# Patient Record
Sex: FEMALE | Race: WHITE | Employment: FULL TIME | URBAN - METROPOLITAN AREA
[De-identification: names, ages, dates, MRNs, and addresses within clinical notes are randomized per-mention and may not be internally consistent; named-entity substitution may affect disease eponyms.]

---

## 2017-05-22 ENCOUNTER — OFFICE VISIT (OUTPATIENT)
Dept: FAMILY MEDICINE CLINIC | Age: 62
End: 2017-05-22

## 2017-05-22 VITALS
DIASTOLIC BLOOD PRESSURE: 78 MMHG | BODY MASS INDEX: 23.79 KG/M2 | RESPIRATION RATE: 18 BRPM | HEIGHT: 68 IN | SYSTOLIC BLOOD PRESSURE: 136 MMHG | TEMPERATURE: 98.7 F | WEIGHT: 157 LBS | HEART RATE: 71 BPM | OXYGEN SATURATION: 98 %

## 2017-05-22 DIAGNOSIS — E55.9 VITAMIN D DEFICIENCY: ICD-10-CM

## 2017-05-22 DIAGNOSIS — Z13.220 SCREENING FOR LIPID DISORDERS: ICD-10-CM

## 2017-05-22 DIAGNOSIS — R53.83 FATIGUE, UNSPECIFIED TYPE: ICD-10-CM

## 2017-05-22 DIAGNOSIS — F41.9 ANXIETY: Primary | ICD-10-CM

## 2017-05-22 LAB
ALBUMIN SERPL-MCNC: 4.3 G/DL (ref 3.9–4.9)
ALP BLD-CCNC: 65 U/L (ref 40–130)
ALT SERPL-CCNC: 29 U/L (ref 0–33)
ANION GAP SERPL CALCULATED.3IONS-SCNC: 14 MEQ/L (ref 7–13)
AST SERPL-CCNC: 25 U/L (ref 0–35)
BASOPHILS ABSOLUTE: 0 K/UL (ref 0–0.2)
BASOPHILS RELATIVE PERCENT: 0.7 %
BILIRUB SERPL-MCNC: 0.2 MG/DL (ref 0–1.2)
BUN BLDV-MCNC: 24 MG/DL (ref 6–20)
CALCIUM SERPL-MCNC: 9 MG/DL (ref 8.6–10.2)
CHLORIDE BLD-SCNC: 103 MEQ/L (ref 98–107)
CHOLESTEROL, TOTAL: 188 MG/DL (ref 0–199)
CO2: 24 MEQ/L (ref 22–29)
CREAT SERPL-MCNC: 0.93 MG/DL (ref 0.5–0.9)
EOSINOPHILS ABSOLUTE: 0.1 K/UL (ref 0–0.7)
EOSINOPHILS RELATIVE PERCENT: 1.4 %
GFR AFRICAN AMERICAN: >60
GFR NON-AFRICAN AMERICAN: >60
GLOBULIN: 2.5 G/DL (ref 2.3–3.5)
GLUCOSE BLD-MCNC: 94 MG/DL (ref 74–109)
HCT VFR BLD CALC: 39.6 % (ref 37–47)
HDLC SERPL-MCNC: 66 MG/DL (ref 40–59)
HEMOGLOBIN: 12.8 G/DL (ref 12–16)
LDL CHOLESTEROL CALCULATED: 87 MG/DL (ref 0–129)
LYMPHOCYTES ABSOLUTE: 1.9 K/UL (ref 1–4.8)
LYMPHOCYTES RELATIVE PERCENT: 31.7 %
MCH RBC QN AUTO: 31.7 PG (ref 27–31.3)
MCHC RBC AUTO-ENTMCNC: 32.5 % (ref 33–37)
MCV RBC AUTO: 97.4 FL (ref 82–100)
MONOCYTES ABSOLUTE: 0.4 K/UL (ref 0.2–0.8)
MONOCYTES RELATIVE PERCENT: 6.7 %
NEUTROPHILS ABSOLUTE: 3.5 K/UL (ref 1.4–6.5)
NEUTROPHILS RELATIVE PERCENT: 59.5 %
PDW BLD-RTO: 13.9 % (ref 11.5–14.5)
PLATELET # BLD: 160 K/UL (ref 130–400)
POTASSIUM SERPL-SCNC: 4.6 MEQ/L (ref 3.5–5.1)
RBC # BLD: 4.06 M/UL (ref 4.2–5.4)
SODIUM BLD-SCNC: 141 MEQ/L (ref 132–144)
T3 FREE: 2.4 PG/ML (ref 2–4.4)
T4 FREE: 0.95 NG/DL (ref 0.93–1.7)
TOTAL PROTEIN: 6.8 G/DL (ref 6.4–8.1)
TRIGL SERPL-MCNC: 176 MG/DL (ref 0–200)
TSH SERPL DL<=0.05 MIU/L-ACNC: 1.65 UIU/ML (ref 0.27–4.2)
VITAMIN D 25-HYDROXY: 41.1 NG/ML (ref 30–100)
WBC # BLD: 5.9 K/UL (ref 4.8–10.8)

## 2017-05-22 PROCEDURE — 99213 OFFICE O/P EST LOW 20 MIN: CPT | Performed by: NURSE PRACTITIONER

## 2017-05-22 RX ORDER — CLONAZEPAM 0.5 MG/1
0.25 TABLET ORAL 2 TIMES DAILY PRN
COMMUNITY
End: 2017-06-30 | Stop reason: ALTCHOICE

## 2017-05-22 ASSESSMENT — PATIENT HEALTH QUESTIONNAIRE - PHQ9
SUM OF ALL RESPONSES TO PHQ QUESTIONS 1-9: 0
1. LITTLE INTEREST OR PLEASURE IN DOING THINGS: 0
SUM OF ALL RESPONSES TO PHQ9 QUESTIONS 1 & 2: 0
2. FEELING DOWN, DEPRESSED OR HOPELESS: 0

## 2017-06-30 ENCOUNTER — OFFICE VISIT (OUTPATIENT)
Dept: FAMILY MEDICINE CLINIC | Age: 62
End: 2017-06-30

## 2017-06-30 VITALS
DIASTOLIC BLOOD PRESSURE: 72 MMHG | HEART RATE: 87 BPM | SYSTOLIC BLOOD PRESSURE: 122 MMHG | RESPIRATION RATE: 14 BRPM | BODY MASS INDEX: 23.49 KG/M2 | WEIGHT: 155 LBS | HEIGHT: 68 IN | TEMPERATURE: 98.3 F

## 2017-06-30 DIAGNOSIS — F41.1 GAD (GENERALIZED ANXIETY DISORDER): ICD-10-CM

## 2017-06-30 DIAGNOSIS — N95.1 SYMPTOMATIC MENOPAUSAL OR FEMALE CLIMACTERIC STATES: Primary | ICD-10-CM

## 2017-06-30 PROCEDURE — 99213 OFFICE O/P EST LOW 20 MIN: CPT | Performed by: FAMILY MEDICINE

## 2017-06-30 RX ORDER — CLONAZEPAM 0.5 MG/1
0.5 TABLET ORAL DAILY PRN
Qty: 30 TABLET | Refills: 0 | Status: ON HOLD | OUTPATIENT
Start: 2017-06-30 | End: 2017-12-22

## 2017-07-10 ASSESSMENT — ENCOUNTER SYMPTOMS
BLOOD IN STOOL: 0
ABDOMINAL PAIN: 0
SHORTNESS OF BREATH: 0

## 2017-09-14 DIAGNOSIS — J20.9 BRONCHOSPASM WITH BRONCHITIS, ACUTE: ICD-10-CM

## 2017-09-14 RX ORDER — ALBUTEROL SULFATE 90 UG/1
2 AEROSOL, METERED RESPIRATORY (INHALATION) EVERY 6 HOURS PRN
Qty: 1 INHALER | Refills: 3 | Status: SHIPPED | OUTPATIENT
Start: 2017-09-14 | End: 2018-10-15 | Stop reason: SDUPTHER

## 2017-10-27 ENCOUNTER — OFFICE VISIT (OUTPATIENT)
Dept: FAMILY MEDICINE CLINIC | Age: 62
End: 2017-10-27

## 2017-10-27 VITALS
TEMPERATURE: 97 F | RESPIRATION RATE: 14 BRPM | HEART RATE: 79 BPM | HEIGHT: 68 IN | SYSTOLIC BLOOD PRESSURE: 108 MMHG | DIASTOLIC BLOOD PRESSURE: 70 MMHG | WEIGHT: 155 LBS | BODY MASS INDEX: 23.49 KG/M2

## 2017-10-27 DIAGNOSIS — F41.1 GAD (GENERALIZED ANXIETY DISORDER): Primary | ICD-10-CM

## 2017-10-27 PROCEDURE — 3014F SCREEN MAMMO DOC REV: CPT | Performed by: FAMILY MEDICINE

## 2017-10-27 PROCEDURE — G8427 DOCREV CUR MEDS BY ELIG CLIN: HCPCS | Performed by: FAMILY MEDICINE

## 2017-10-27 PROCEDURE — G8484 FLU IMMUNIZE NO ADMIN: HCPCS | Performed by: FAMILY MEDICINE

## 2017-10-27 PROCEDURE — 1036F TOBACCO NON-USER: CPT | Performed by: FAMILY MEDICINE

## 2017-10-27 PROCEDURE — 3017F COLORECTAL CA SCREEN DOC REV: CPT | Performed by: FAMILY MEDICINE

## 2017-10-27 PROCEDURE — 99212 OFFICE O/P EST SF 10 MIN: CPT | Performed by: FAMILY MEDICINE

## 2017-10-27 PROCEDURE — G8420 CALC BMI NORM PARAMETERS: HCPCS | Performed by: FAMILY MEDICINE

## 2017-10-27 NOTE — PROGRESS NOTES
Subjective  Constantine Mortimer, 63 y.o. female presents today with:  Chief Complaint   Patient presents with    Anxiety     4 mnth f/u        HPI  Patient presents today for a four month follow up for anxiety. 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 .and son's wedding, much less stress  Uses klonopin PRN only and effective  Rev/rxs; No abuse nor side effects on meds   ; No cardio-pulmonary probs;compliant with diet/rxs;no new gi-gu complaints   Review of Systems   Constitutional: Negative for fatigue and unexpected weight change. Cardiovascular: Negative for chest pain, palpitations and leg swelling. Gastrointestinal: Negative for abdominal pain and blood in stool. Musculoskeletal: Negative for arthralgias. Skin: Positive for rash (s/p wide excision of right upper arm melanoma excision/revision). Neurological: Negative for headaches. Psychiatric/Behavioral: Negative for confusion, sleep disturbance and suicidal ideas. The patient is nervous/anxious (stable/rxs prn). Allergies   Allergen Reactions    Cephalexin      Other reaction(s): Intolerance  Patient started turning bright red after first day of taking medication.  Doxycycline Other (See Comments)     Cramping throughout body    Pristiq [Desvenlafaxine] Nausea Only       Past Medical History:   Diagnosis Date    Adult ADHD     Bulging disc     DJD (degenerative joint disease)     GERD (gastroesophageal reflux disease)     Herniated disc     L5-S1    History of depression      Past Surgical History:   Procedure Laterality Date    COLONOSCOPY  01/10/2014    DR. EVANS    [facility]     TUBAL LIGATION  2/1997     Social History     Social History    Marital status:      Spouse name: N/A    Number of children: N/A    Years of education: N/A     Occupational History    Not on file.      Social History Main Topics    Smoking status: Never Smoker    Smokeless tobacco: Never Used    Alcohol use No    Drug use: No    Sexual activity: Not on file     Other Topics Concern    Not on file     Social History Narrative    No narrative on file     Family History   Problem Relation Age of Onset    Mental Illness Mother     Cancer Father      SKIN CANCER          Current Outpatient Prescriptions on File Prior to Visit   Medication Sig Dispense Refill    albuterol sulfate HFA (PROAIR HFA) 108 (90 Base) MCG/ACT inhaler Inhale 2 puffs into the lungs every 6 hours as needed for Wheezing 1 Inhaler 3    clonazePAM (KLONOPIN) 0.5 MG tablet Take 1 tablet by mouth daily as needed for Anxiety 30 tablet 0    cyclobenzaprine (FLEXERIL) 10 MG tablet Take 1/2 at lunch and 1 qhs prn tightness 30 tablet 2     No current facility-administered medications on file prior to visit. Objective    Vitals:    10/27/17 0930   BP: 108/70   Pulse: 79   Resp: 14   Temp: 97 °F (36.1 °C)   TempSrc: Temporal   Weight: 155 lb (70.3 kg)   Height: 5' 8\" (1.727 m)     Physical Exam   Constitutional: She is well-developed, well-nourished, and in no distress. No distress. Neck: Normal range of motion. Neck supple. No thyromegaly present. Cardiovascular: Normal rate, regular rhythm and normal heart sounds. Exam reveals no gallop. No murmur heard. Pulmonary/Chest: Effort normal and breath sounds normal.   Musculoskeletal: She exhibits no edema. Skin: She is not diaphoretic. Nicely healing incision;color good   Psychiatric: Mood, memory and affect normal.   ;  Assessment & Plan   1. LORI (generalized anxiety disorder)     f/u PS prn  Klonopin prn  ;See above orders, as use and side effects reviewed ;Continue same meds, as common side effects and use reviewed-call if ineffective or problems ;   Outpatient Encounter Prescriptions as of 10/27/2017   Medication Sig Dispense Refill    albuterol sulfate HFA (PROAIR HFA) 108 (90 Base) MCG/ACT inhaler Inhale 2 puffs into the lungs every 6 hours as needed for Wheezing 1 Inhaler 3    clonazePAM (KLONOPIN) 0.5 MG tablet Take 1 tablet by mouth daily as needed for Anxiety 30 tablet 0    [DISCONTINUED] ESTRADIOL PO Take by mouth      cyclobenzaprine (FLEXERIL) 10 MG tablet Take 1/2 at lunch and 1 qhs prn tightness 30 tablet 2     No facility-administered encounter medications on file as of 10/27/2017. Call if probs  No orders of the defined types were placed in this encounter. No orders of the defined types were placed in this encounter. Medications Discontinued During This Encounter   Medication Reason    ESTRADIOL PO Therapy completed     Return in about 3 months (around 1/27/2018) for delia. Call or return to clinic prn if these symptoms worsen or fail to improve as anticipated.       Patrick Jain, DO

## 2017-11-07 PROBLEM — F41.1 GAD (GENERALIZED ANXIETY DISORDER): Status: ACTIVE | Noted: 2017-11-07

## 2017-11-07 ASSESSMENT — ENCOUNTER SYMPTOMS
ABDOMINAL PAIN: 0
BLOOD IN STOOL: 0

## 2017-12-20 RX ORDER — LIDOCAINE HYDROCHLORIDE 10 MG/ML
1 INJECTION, SOLUTION EPIDURAL; INFILTRATION; INTRACAUDAL; PERINEURAL
Status: CANCELLED | OUTPATIENT
Start: 2017-12-20 | End: 2017-12-20

## 2017-12-22 ENCOUNTER — ANESTHESIA (OUTPATIENT)
Dept: ENDOSCOPY | Age: 62
End: 2017-12-22
Payer: COMMERCIAL

## 2017-12-22 ENCOUNTER — ANESTHESIA EVENT (OUTPATIENT)
Dept: ENDOSCOPY | Age: 62
End: 2017-12-22
Payer: COMMERCIAL

## 2017-12-22 ENCOUNTER — HOSPITAL ENCOUNTER (OUTPATIENT)
Age: 62
Setting detail: OUTPATIENT SURGERY
Discharge: HOME | End: 2017-12-22
Attending: SPECIALIST | Admitting: SPECIALIST
Payer: COMMERCIAL

## 2017-12-22 VITALS
HEART RATE: 64 BPM | HEIGHT: 68 IN | RESPIRATION RATE: 16 BRPM | OXYGEN SATURATION: 100 % | BODY MASS INDEX: 23.49 KG/M2 | TEMPERATURE: 97.9 F | WEIGHT: 155 LBS | DIASTOLIC BLOOD PRESSURE: 82 MMHG | SYSTOLIC BLOOD PRESSURE: 139 MMHG

## 2017-12-22 VITALS
SYSTOLIC BLOOD PRESSURE: 116 MMHG | DIASTOLIC BLOOD PRESSURE: 67 MMHG | OXYGEN SATURATION: 99 % | RESPIRATION RATE: 14 BRPM

## 2017-12-22 PROCEDURE — 3609027000 HC COLONOSCOPY: Performed by: SPECIALIST

## 2017-12-22 PROCEDURE — 2500000003 HC RX 250 WO HCPCS

## 2017-12-22 PROCEDURE — 7100000010 HC PHASE II RECOVERY - FIRST 15 MIN: Performed by: SPECIALIST

## 2017-12-22 PROCEDURE — 2580000003 HC RX 258: Performed by: SPECIALIST

## 2017-12-22 PROCEDURE — 3700000001 HC ADD 15 MINUTES (ANESTHESIA): Performed by: SPECIALIST

## 2017-12-22 PROCEDURE — 6360000002 HC RX W HCPCS

## 2017-12-22 PROCEDURE — 3700000000 HC ANESTHESIA ATTENDED CARE: Performed by: SPECIALIST

## 2017-12-22 RX ORDER — SODIUM CHLORIDE 0.9 % (FLUSH) 0.9 %
10 SYRINGE (ML) INJECTION PRN
Status: DISCONTINUED | OUTPATIENT
Start: 2017-12-22 | End: 2017-12-22 | Stop reason: HOSPADM

## 2017-12-22 RX ORDER — LIDOCAINE HYDROCHLORIDE 10 MG/ML
INJECTION, SOLUTION INFILTRATION; PERINEURAL PRN
Status: DISCONTINUED | OUTPATIENT
Start: 2017-12-22 | End: 2017-12-22 | Stop reason: SDUPTHER

## 2017-12-22 RX ORDER — PROPOFOL 10 MG/ML
INJECTION, EMULSION INTRAVENOUS PRN
Status: DISCONTINUED | OUTPATIENT
Start: 2017-12-22 | End: 2017-12-22 | Stop reason: SDUPTHER

## 2017-12-22 RX ORDER — SODIUM CHLORIDE 9 MG/ML
INJECTION, SOLUTION INTRAVENOUS CONTINUOUS
Status: DISCONTINUED | OUTPATIENT
Start: 2017-12-22 | End: 2017-12-22 | Stop reason: HOSPADM

## 2017-12-22 RX ORDER — SODIUM CHLORIDE 0.9 % (FLUSH) 0.9 %
10 SYRINGE (ML) INJECTION EVERY 12 HOURS SCHEDULED
Status: DISCONTINUED | OUTPATIENT
Start: 2017-12-22 | End: 2017-12-22 | Stop reason: HOSPADM

## 2017-12-22 RX ADMIN — PROPOFOL 20 MG: 10 INJECTION, EMULSION INTRAVENOUS at 12:00

## 2017-12-22 RX ADMIN — PROPOFOL 20 MG: 10 INJECTION, EMULSION INTRAVENOUS at 11:58

## 2017-12-22 RX ADMIN — PROPOFOL 40 MG: 10 INJECTION, EMULSION INTRAVENOUS at 11:56

## 2017-12-22 RX ADMIN — LIDOCAINE HYDROCHLORIDE 30 MG: 10 INJECTION, SOLUTION INFILTRATION; PERINEURAL at 11:56

## 2017-12-22 RX ADMIN — Medication 5 ML: at 11:56

## 2017-12-22 NOTE — ANESTHESIA PRE PROCEDURE
HISTORY  1984    BIRTH [place]     TUBAL LIGATION  2/1997       Social History:    Social History   Substance Use Topics    Smoking status: Never Smoker    Smokeless tobacco: Never Used    Alcohol use 0.6 oz/week     1 Glasses of wine per week      Comment: occasionally                                Counseling given: Not Answered      Vital Signs (Current):   Vitals:    12/22/17 1049   BP: 137/77   Pulse: 78   Resp: 16   Temp: 36.6 °C (97.9 °F)   TempSrc: Temporal   SpO2: 100%   Weight: 155 lb (70.3 kg)   Height: 5' 8\" (1.727 m)                                              BP Readings from Last 3 Encounters:   12/22/17 137/77   10/27/17 108/70   06/30/17 122/72       NPO Status: Time of last liquid consumption: 0700 (Approx. 4 ounces of water.)                        Time of last solid consumption: 2000                        Date of last liquid consumption: 12/22/17                        Date of last solid food consumption: 12/20/17    BMI:   Wt Readings from Last 3 Encounters:   12/22/17 155 lb (70.3 kg)   10/27/17 155 lb (70.3 kg)   06/30/17 155 lb (70.3 kg)     Body mass index is 23.57 kg/m². CBC:   Lab Results   Component Value Date    WBC 5.9 05/22/2017    RBC 4.06 05/22/2017    HGB 12.8 05/22/2017    HCT 39.6 05/22/2017    MCV 97.4 05/22/2017    RDW 13.9 05/22/2017     05/22/2017       CMP:   Lab Results   Component Value Date     05/22/2017    K 4.6 05/22/2017     05/22/2017    CO2 24 05/22/2017    BUN 24 05/22/2017    CREATININE 0.93 05/22/2017    GFRAA >60.0 05/22/2017    LABGLOM >60.0 05/22/2017    GLUCOSE 94 05/22/2017    PROT 6.8 05/22/2017    CALCIUM 9.0 05/22/2017    BILITOT 0.2 05/22/2017    ALKPHOS 65 05/22/2017    AST 25 05/22/2017    ALT 29 05/22/2017       POC Tests: No results for input(s): POCGLU, POCNA, POCK, POCCL, POCBUN, POCHEMO, POCHCT in the last 72 hours.     Coags: No results found for: PROTIME, INR, APTT    HCG (If Applicable): No results found for: PREGTESTUR,

## 2018-01-24 ENCOUNTER — TELEPHONE (OUTPATIENT)
Dept: OTHER | Facility: CLINIC | Age: 63
End: 2018-01-24

## 2018-10-05 ENCOUNTER — OFFICE VISIT (OUTPATIENT)
Dept: FAMILY MEDICINE CLINIC | Age: 63
End: 2018-10-05
Payer: COMMERCIAL

## 2018-10-05 ENCOUNTER — TELEPHONE (OUTPATIENT)
Dept: FAMILY MEDICINE CLINIC | Age: 63
End: 2018-10-05

## 2018-10-05 VITALS
BODY MASS INDEX: 23.95 KG/M2 | SYSTOLIC BLOOD PRESSURE: 120 MMHG | TEMPERATURE: 97.7 F | HEART RATE: 77 BPM | RESPIRATION RATE: 12 BRPM | HEIGHT: 68 IN | OXYGEN SATURATION: 98 % | WEIGHT: 158 LBS | DIASTOLIC BLOOD PRESSURE: 70 MMHG

## 2018-10-05 DIAGNOSIS — Z12.39 SCREENING FOR BREAST CANCER: Primary | ICD-10-CM

## 2018-10-05 DIAGNOSIS — H73.891 TYMPANIC MEMBRANE RETRACTION, RIGHT: Primary | ICD-10-CM

## 2018-10-05 DIAGNOSIS — J34.89 SINUS PRESSURE: ICD-10-CM

## 2018-10-05 PROBLEM — C43.61 MALIGNANT MELANOMA OF RIGHT UPPER EXTREMITY INCLUDING SHOULDER (HCC): Status: ACTIVE | Noted: 2017-09-14

## 2018-10-05 PROCEDURE — 99213 OFFICE O/P EST LOW 20 MIN: CPT | Performed by: NURSE PRACTITIONER

## 2018-10-05 RX ORDER — AMOXICILLIN 500 MG/1
500 CAPSULE ORAL 3 TIMES DAILY
Qty: 21 CAPSULE | Refills: 0 | Status: SHIPPED | OUTPATIENT
Start: 2018-10-05 | End: 2018-10-12

## 2018-10-05 RX ORDER — METHYLPREDNISOLONE 4 MG/1
TABLET ORAL
Qty: 1 KIT | Refills: 0 | Status: SHIPPED | OUTPATIENT
Start: 2018-10-05

## 2018-10-05 RX ORDER — LEVOCETIRIZINE DIHYDROCHLORIDE 5 MG/1
5 TABLET, FILM COATED ORAL NIGHTLY
Qty: 30 TABLET | Refills: 0 | Status: SHIPPED | OUTPATIENT
Start: 2018-10-05

## 2018-10-05 ASSESSMENT — PATIENT HEALTH QUESTIONNAIRE - PHQ9
SUM OF ALL RESPONSES TO PHQ9 QUESTIONS 1 & 2: 0
2. FEELING DOWN, DEPRESSED OR HOPELESS: 0
SUM OF ALL RESPONSES TO PHQ QUESTIONS 1-9: 0
SUM OF ALL RESPONSES TO PHQ QUESTIONS 1-9: 0
1. LITTLE INTEREST OR PLEASURE IN DOING THINGS: 0

## 2018-10-08 ENCOUNTER — TELEPHONE (OUTPATIENT)
Dept: FAMILY MEDICINE CLINIC | Age: 63
End: 2018-10-08

## 2018-10-15 DIAGNOSIS — J20.9 BRONCHOSPASM WITH BRONCHITIS, ACUTE: ICD-10-CM

## 2018-10-15 ASSESSMENT — ENCOUNTER SYMPTOMS
VOMITING: 0
RHINORRHEA: 1
SORE THROAT: 0
DIARRHEA: 0
COUGH: 0
ABDOMINAL PAIN: 0

## 2018-10-16 ENCOUNTER — TELEPHONE (OUTPATIENT)
Dept: FAMILY MEDICINE CLINIC | Age: 63
End: 2018-10-16

## 2018-10-16 DIAGNOSIS — J01.90 ACUTE BACTERIAL SINUSITIS: Primary | ICD-10-CM

## 2018-10-16 DIAGNOSIS — B96.89 ACUTE BACTERIAL SINUSITIS: Primary | ICD-10-CM

## 2018-10-16 RX ORDER — AZITHROMYCIN 250 MG/1
TABLET, FILM COATED ORAL
Qty: 6 TABLET | Refills: 0 | Status: SHIPPED | OUTPATIENT
Start: 2018-10-16 | End: 2018-10-26

## 2018-10-16 NOTE — PROGRESS NOTES
Subjective  Carmela Marie, 63 y.o. female presents today with:  Chief Complaint   Patient presents with    Otitis Media     right ear pain       Otalgia    There is pain in the right ear. This is a new problem. The current episode started in the past 7 days. The problem occurs constantly. The problem has been gradually worsening. There has been no fever. The pain is at a severity of 5/10. The pain is moderate. Associated symptoms include headaches (and sinus pressure) and rhinorrhea. Pertinent negatives include no abdominal pain, coughing, diarrhea, ear discharge, hearing loss, neck pain, rash, sore throat or vomiting. She has tried NSAIDs for the symptoms. The treatment provided no relief. Objective    Vitals:    10/05/18 1006   BP: 120/70   Pulse: 77   Resp: 12   Temp: 97.7 °F (36.5 °C)   TempSrc: Temporal   SpO2: 98%   Weight: 158 lb (71.7 kg)   Height: 5' 8\" (1.727 m)       Physical Exam   Constitutional: She is oriented to person, place, and time. She appears well-developed and well-nourished. HENT:   Head: Normocephalic and atraumatic. Right Ear: Hearing, external ear and ear canal normal. Tympanic membrane is retracted. Left Ear: Hearing, external ear and ear canal normal. A middle ear effusion is present. Nose: Mucosal edema present. No rhinorrhea. Right sinus exhibits no maxillary sinus tenderness and no frontal sinus tenderness. Left sinus exhibits no maxillary sinus tenderness and no frontal sinus tenderness. Mouth/Throat: Uvula is midline, oropharynx is clear and moist and mucous membranes are normal.   Eyes: EOM are normal.   Neck: Normal range of motion. Cardiovascular: Normal rate, regular rhythm and normal heart sounds. Pulmonary/Chest: Effort normal and breath sounds normal.   Abdominal: Soft. Bowel sounds are normal.   Musculoskeletal: Normal range of motion. Neurological: She is alert and oriented to person, place, and time. Skin: Skin is warm and dry.    Psychiatric: She has a normal mood and affect. Assessment & Plan    Diagnosis Orders   1. Tympanic membrane retraction, right  amoxicillin (AMOXIL) 500 MG capsule    levocetirizine (XYZAL) 5 MG tablet    methylPREDNISolone (MEDROL DOSEPACK) 4 MG tablet   2. Sinus pressure  amoxicillin (AMOXIL) 500 MG capsule    levocetirizine (XYZAL) 5 MG tablet    methylPREDNISolone (MEDROL DOSEPACK) 4 MG tablet       Return if symptoms worsen or fail to improve. Reviewed with the patient: current clinical status, medications, activities and diet. Side effects, adverse effects of the medication prescribed today, as well as treatment plan/ rationale and result expectations have beendiscussed with the patient who expresses understanding and desires to proceed. Close follow up to evaluate treatment results and for coordinationof care. I have reviewed the patient's medical history in detail and updated the computerized patient record.     Niels Cunningham, APRN - CNP

## 2019-03-01 ENCOUNTER — TELEPHONE (OUTPATIENT)
Dept: FAMILY MEDICINE CLINIC | Age: 64
End: 2019-03-01

## 2024-04-19 ENCOUNTER — APPOINTMENT (OUTPATIENT)
Dept: RADIOLOGY | Facility: HOSPITAL | Age: 69
DRG: 482 | End: 2024-04-19
Payer: MEDICARE

## 2024-04-19 ENCOUNTER — HOSPITAL ENCOUNTER (INPATIENT)
Facility: HOSPITAL | Age: 69
LOS: 2 days | Discharge: HOME | DRG: 482 | End: 2024-04-21
Attending: STUDENT IN AN ORGANIZED HEALTH CARE EDUCATION/TRAINING PROGRAM | Admitting: STUDENT IN AN ORGANIZED HEALTH CARE EDUCATION/TRAINING PROGRAM
Payer: MEDICARE

## 2024-04-19 ENCOUNTER — APPOINTMENT (OUTPATIENT)
Dept: CARDIOLOGY | Facility: HOSPITAL | Age: 69
DRG: 482 | End: 2024-04-19
Payer: MEDICARE

## 2024-04-19 DIAGNOSIS — M80.059D HIP FRACTURE DUE TO OSTEOPOROSIS WITH ROUTINE HEALING: ICD-10-CM

## 2024-04-19 DIAGNOSIS — S72.012A CLOSED SUBCAPITAL FRACTURE OF LEFT FEMUR, INITIAL ENCOUNTER (MULTI): Primary | ICD-10-CM

## 2024-04-19 LAB
ABO GROUP (TYPE) IN BLOOD: NORMAL
ANION GAP SERPL CALC-SCNC: 12 MMOL/L (ref 10–20)
ANTIBODY SCREEN: NORMAL
APTT PPP: 34 SECONDS (ref 27–38)
BASOPHILS # BLD AUTO: 0.02 X10*3/UL (ref 0–0.1)
BASOPHILS NFR BLD AUTO: 0.3 %
BUN SERPL-MCNC: 18 MG/DL (ref 6–23)
CALCIUM SERPL-MCNC: 9.4 MG/DL (ref 8.6–10.3)
CARDIAC TROPONIN I PNL SERPL HS: 11 NG/L (ref 0–13)
CARDIAC TROPONIN I PNL SERPL HS: 12 NG/L (ref 0–13)
CHLORIDE SERPL-SCNC: 106 MMOL/L (ref 98–107)
CO2 SERPL-SCNC: 25 MMOL/L (ref 21–32)
CREAT SERPL-MCNC: 0.93 MG/DL (ref 0.5–1.05)
EGFRCR SERPLBLD CKD-EPI 2021: 68 ML/MIN/1.73M*2
EOSINOPHIL # BLD AUTO: 0.04 X10*3/UL (ref 0–0.7)
EOSINOPHIL NFR BLD AUTO: 0.7 %
ERYTHROCYTE [DISTWIDTH] IN BLOOD BY AUTOMATED COUNT: 13.4 % (ref 11.5–14.5)
GLUCOSE SERPL-MCNC: 85 MG/DL (ref 74–99)
HCT VFR BLD AUTO: 39.1 % (ref 36–46)
HGB BLD-MCNC: 13.2 G/DL (ref 12–16)
HOLD SPECIMEN: NORMAL
IMM GRANULOCYTES # BLD AUTO: 0.03 X10*3/UL (ref 0–0.7)
IMM GRANULOCYTES NFR BLD AUTO: 0.5 % (ref 0–0.9)
INR PPP: 1 (ref 0.9–1.1)
LYMPHOCYTES # BLD AUTO: 1.58 X10*3/UL (ref 1.2–4.8)
LYMPHOCYTES NFR BLD AUTO: 26.8 %
MCH RBC QN AUTO: 31.6 PG (ref 26–34)
MCHC RBC AUTO-ENTMCNC: 33.8 G/DL (ref 32–36)
MCV RBC AUTO: 94 FL (ref 80–100)
MONOCYTES # BLD AUTO: 0.37 X10*3/UL (ref 0.1–1)
MONOCYTES NFR BLD AUTO: 6.3 %
NEUTROPHILS # BLD AUTO: 3.86 X10*3/UL (ref 1.2–7.7)
NEUTROPHILS NFR BLD AUTO: 65.4 %
NRBC BLD-RTO: 0 /100 WBCS (ref 0–0)
PLATELET # BLD AUTO: 145 X10*3/UL (ref 150–450)
POTASSIUM SERPL-SCNC: 3.7 MMOL/L (ref 3.5–5.3)
PROTHROMBIN TIME: 11.7 SECONDS (ref 9.8–12.8)
RBC # BLD AUTO: 4.18 X10*6/UL (ref 4–5.2)
RH FACTOR (ANTIGEN D): NORMAL
SODIUM SERPL-SCNC: 139 MMOL/L (ref 136–145)
WBC # BLD AUTO: 5.9 X10*3/UL (ref 4.4–11.3)

## 2024-04-19 PROCEDURE — 84484 ASSAY OF TROPONIN QUANT: CPT | Performed by: STUDENT IN AN ORGANIZED HEALTH CARE EDUCATION/TRAINING PROGRAM

## 2024-04-19 PROCEDURE — 72131 CT LUMBAR SPINE W/O DYE: CPT | Performed by: RADIOLOGY

## 2024-04-19 PROCEDURE — 36415 COLL VENOUS BLD VENIPUNCTURE: CPT | Performed by: STUDENT IN AN ORGANIZED HEALTH CARE EDUCATION/TRAINING PROGRAM

## 2024-04-19 PROCEDURE — 85730 THROMBOPLASTIN TIME PARTIAL: CPT | Performed by: STUDENT IN AN ORGANIZED HEALTH CARE EDUCATION/TRAINING PROGRAM

## 2024-04-19 PROCEDURE — 76377 3D RENDER W/INTRP POSTPROCES: CPT

## 2024-04-19 PROCEDURE — 72170 X-RAY EXAM OF PELVIS: CPT | Performed by: RADIOLOGY

## 2024-04-19 PROCEDURE — 2500000004 HC RX 250 GENERAL PHARMACY W/ HCPCS (ALT 636 FOR OP/ED): Performed by: STUDENT IN AN ORGANIZED HEALTH CARE EDUCATION/TRAINING PROGRAM

## 2024-04-19 PROCEDURE — 72170 X-RAY EXAM OF PELVIS: CPT

## 2024-04-19 PROCEDURE — 2500000001 HC RX 250 WO HCPCS SELF ADMINISTERED DRUGS (ALT 637 FOR MEDICARE OP): Performed by: STUDENT IN AN ORGANIZED HEALTH CARE EDUCATION/TRAINING PROGRAM

## 2024-04-19 PROCEDURE — 73552 X-RAY EXAM OF FEMUR 2/>: CPT | Mod: LEFT SIDE | Performed by: RADIOLOGY

## 2024-04-19 PROCEDURE — 85610 PROTHROMBIN TIME: CPT | Performed by: STUDENT IN AN ORGANIZED HEALTH CARE EDUCATION/TRAINING PROGRAM

## 2024-04-19 PROCEDURE — 1100000001 HC PRIVATE ROOM DAILY

## 2024-04-19 PROCEDURE — 93005 ELECTROCARDIOGRAM TRACING: CPT

## 2024-04-19 PROCEDURE — 72192 CT PELVIS W/O DYE: CPT

## 2024-04-19 PROCEDURE — 99285 EMERGENCY DEPT VISIT HI MDM: CPT | Mod: 25

## 2024-04-19 PROCEDURE — 82374 ASSAY BLOOD CARBON DIOXIDE: CPT | Performed by: STUDENT IN AN ORGANIZED HEALTH CARE EDUCATION/TRAINING PROGRAM

## 2024-04-19 PROCEDURE — 85025 COMPLETE CBC W/AUTO DIFF WBC: CPT | Performed by: STUDENT IN AN ORGANIZED HEALTH CARE EDUCATION/TRAINING PROGRAM

## 2024-04-19 PROCEDURE — 73552 X-RAY EXAM OF FEMUR 2/>: CPT | Mod: LT

## 2024-04-19 PROCEDURE — 96375 TX/PRO/DX INJ NEW DRUG ADDON: CPT

## 2024-04-19 PROCEDURE — 72131 CT LUMBAR SPINE W/O DYE: CPT

## 2024-04-19 PROCEDURE — 86901 BLOOD TYPING SEROLOGIC RH(D): CPT | Performed by: STUDENT IN AN ORGANIZED HEALTH CARE EDUCATION/TRAINING PROGRAM

## 2024-04-19 PROCEDURE — 99222 1ST HOSP IP/OBS MODERATE 55: CPT | Performed by: STUDENT IN AN ORGANIZED HEALTH CARE EDUCATION/TRAINING PROGRAM

## 2024-04-19 PROCEDURE — 96374 THER/PROPH/DIAG INJ IV PUSH: CPT

## 2024-04-19 RX ORDER — KETOROLAC TROMETHAMINE 30 MG/ML
30 INJECTION, SOLUTION INTRAMUSCULAR; INTRAVENOUS ONCE
Status: COMPLETED | OUTPATIENT
Start: 2024-04-19 | End: 2024-04-19

## 2024-04-19 RX ORDER — ACETAMINOPHEN 325 MG/1
650 TABLET ORAL EVERY 4 HOURS PRN
Status: DISCONTINUED | OUTPATIENT
Start: 2024-04-19 | End: 2024-04-21 | Stop reason: HOSPADM

## 2024-04-19 RX ORDER — ACETAMINOPHEN 325 MG/1
650 TABLET ORAL EVERY 4 HOURS PRN
Status: DISCONTINUED | OUTPATIENT
Start: 2024-04-19 | End: 2024-04-20 | Stop reason: SDUPTHER

## 2024-04-19 RX ORDER — DIAZEPAM 5 MG/ML
2 INJECTION, SOLUTION INTRAMUSCULAR; INTRAVENOUS ONCE
Status: COMPLETED | OUTPATIENT
Start: 2024-04-19 | End: 2024-04-19

## 2024-04-19 RX ORDER — MINERAL OIL
1 ENEMA (ML) RECTAL DAILY PRN
COMMUNITY

## 2024-04-19 RX ORDER — CYCLOSPORINE 0.5 MG/ML
1 EMULSION OPHTHALMIC 2 TIMES DAILY
COMMUNITY

## 2024-04-19 RX ORDER — HYDRALAZINE HYDROCHLORIDE 20 MG/ML
5 INJECTION INTRAMUSCULAR; INTRAVENOUS EVERY 6 HOURS PRN
Status: DISCONTINUED | OUTPATIENT
Start: 2024-04-19 | End: 2024-04-21 | Stop reason: HOSPADM

## 2024-04-19 RX ORDER — POLYETHYLENE GLYCOL 3350 17 G/17G
17 POWDER, FOR SOLUTION ORAL DAILY PRN
Status: DISCONTINUED | OUTPATIENT
Start: 2024-04-19 | End: 2024-04-21 | Stop reason: HOSPADM

## 2024-04-19 RX ORDER — ACETAMINOPHEN 160 MG/5ML
650 SOLUTION ORAL EVERY 4 HOURS PRN
Status: DISCONTINUED | OUTPATIENT
Start: 2024-04-19 | End: 2024-04-20 | Stop reason: SDUPTHER

## 2024-04-19 RX ORDER — ONDANSETRON HYDROCHLORIDE 2 MG/ML
4 INJECTION, SOLUTION INTRAVENOUS EVERY 8 HOURS PRN
Status: DISCONTINUED | OUTPATIENT
Start: 2024-04-19 | End: 2024-04-20

## 2024-04-19 RX ORDER — ACETAMINOPHEN 650 MG/1
650 SUPPOSITORY RECTAL EVERY 4 HOURS PRN
Status: DISCONTINUED | OUTPATIENT
Start: 2024-04-19 | End: 2024-04-20 | Stop reason: SDUPTHER

## 2024-04-19 RX ORDER — ONDANSETRON 4 MG/1
4 TABLET, FILM COATED ORAL EVERY 8 HOURS PRN
Status: DISCONTINUED | OUTPATIENT
Start: 2024-04-19 | End: 2024-04-20

## 2024-04-19 RX ORDER — ACETAMINOPHEN 650 MG/1
650 SUPPOSITORY RECTAL EVERY 4 HOURS PRN
Status: DISCONTINUED | OUTPATIENT
Start: 2024-04-19 | End: 2024-04-21 | Stop reason: HOSPADM

## 2024-04-19 RX ORDER — ALBUTEROL SULFATE 90 UG/1
2 AEROSOL, METERED RESPIRATORY (INHALATION) EVERY 4 HOURS PRN
COMMUNITY

## 2024-04-19 RX ORDER — DOCUSATE SODIUM 100 MG/1
100 CAPSULE, LIQUID FILLED ORAL 2 TIMES DAILY
Status: DISCONTINUED | OUTPATIENT
Start: 2024-04-19 | End: 2024-04-21 | Stop reason: HOSPADM

## 2024-04-19 RX ORDER — MORPHINE SULFATE 2 MG/ML
2 INJECTION, SOLUTION INTRAMUSCULAR; INTRAVENOUS EVERY 4 HOURS PRN
Status: DISCONTINUED | OUTPATIENT
Start: 2024-04-19 | End: 2024-04-20 | Stop reason: SDUPTHER

## 2024-04-19 RX ORDER — ENOXAPARIN SODIUM 100 MG/ML
40 INJECTION SUBCUTANEOUS EVERY 24 HOURS
Status: DISCONTINUED | OUTPATIENT
Start: 2024-04-19 | End: 2024-04-21 | Stop reason: HOSPADM

## 2024-04-19 RX ORDER — ACETAMINOPHEN 160 MG/5ML
650 SOLUTION ORAL EVERY 4 HOURS PRN
Status: DISCONTINUED | OUTPATIENT
Start: 2024-04-19 | End: 2024-04-21 | Stop reason: HOSPADM

## 2024-04-19 RX ORDER — OXYCODONE AND ACETAMINOPHEN 5; 325 MG/1; MG/1
1 TABLET ORAL EVERY 6 HOURS PRN
Status: DISCONTINUED | OUTPATIENT
Start: 2024-04-19 | End: 2024-04-21 | Stop reason: HOSPADM

## 2024-04-19 RX ORDER — NEOMYCIN SULFATE, POLYMYXIN B SULFATE, AND DEXAMETHASONE 3.5; 10000; 1 MG/G; [USP'U]/G; MG/G
1 OINTMENT OPHTHALMIC 2 TIMES DAILY
COMMUNITY
Start: 2024-04-19

## 2024-04-19 RX ORDER — TALC
3 POWDER (GRAM) TOPICAL NIGHTLY PRN
Status: DISCONTINUED | OUTPATIENT
Start: 2024-04-19 | End: 2024-04-21 | Stop reason: HOSPADM

## 2024-04-19 RX ADMIN — DIAZEPAM 2 MG: 5 INJECTION, SOLUTION INTRAMUSCULAR; INTRAVENOUS at 14:31

## 2024-04-19 RX ADMIN — OXYCODONE HYDROCHLORIDE AND ACETAMINOPHEN 1 TABLET: 5; 325 TABLET ORAL at 20:00

## 2024-04-19 RX ADMIN — KETOROLAC TROMETHAMINE 30 MG: 30 INJECTION, SOLUTION INTRAMUSCULAR at 14:31

## 2024-04-19 RX ADMIN — DOCUSATE SODIUM 100 MG: 100 CAPSULE, LIQUID FILLED ORAL at 20:01

## 2024-04-19 RX ADMIN — ENOXAPARIN SODIUM 40 MG: 40 INJECTION SUBCUTANEOUS at 20:01

## 2024-04-19 SDOH — SOCIAL STABILITY: SOCIAL INSECURITY: ARE THERE ANY APPARENT SIGNS OF INJURIES/BEHAVIORS THAT COULD BE RELATED TO ABUSE/NEGLECT?: NO

## 2024-04-19 SDOH — SOCIAL STABILITY: SOCIAL INSECURITY: ARE YOU OR HAVE YOU BEEN THREATENED OR ABUSED PHYSICALLY, EMOTIONALLY, OR SEXUALLY BY ANYONE?: NO

## 2024-04-19 SDOH — SOCIAL STABILITY: SOCIAL INSECURITY: DO YOU FEEL ANYONE HAS EXPLOITED OR TAKEN ADVANTAGE OF YOU FINANCIALLY OR OF YOUR PERSONAL PROPERTY?: NO

## 2024-04-19 SDOH — SOCIAL STABILITY: SOCIAL INSECURITY: DO YOU FEEL UNSAFE GOING BACK TO THE PLACE WHERE YOU ARE LIVING?: NO

## 2024-04-19 SDOH — SOCIAL STABILITY: SOCIAL INSECURITY: WERE YOU ABLE TO COMPLETE ALL THE BEHAVIORAL HEALTH SCREENINGS?: YES

## 2024-04-19 SDOH — SOCIAL STABILITY: SOCIAL INSECURITY: HAVE YOU HAD ANY THOUGHTS OF HARMING ANYONE ELSE?: NO

## 2024-04-19 SDOH — SOCIAL STABILITY: SOCIAL INSECURITY: HAS ANYONE EVER THREATENED TO HURT YOUR FAMILY OR YOUR PETS?: NO

## 2024-04-19 SDOH — SOCIAL STABILITY: SOCIAL INSECURITY: HAVE YOU HAD THOUGHTS OF HARMING ANYONE ELSE?: NO

## 2024-04-19 SDOH — SOCIAL STABILITY: SOCIAL INSECURITY: ABUSE: ADULT

## 2024-04-19 SDOH — SOCIAL STABILITY: SOCIAL INSECURITY: DOES ANYONE TRY TO KEEP YOU FROM HAVING/CONTACTING OTHER FRIENDS OR DOING THINGS OUTSIDE YOUR HOME?: NO

## 2024-04-19 ASSESSMENT — ACTIVITIES OF DAILY LIVING (ADL)
GROOMING: INDEPENDENT
WALKS IN HOME: INDEPENDENT
HEARING - LEFT EAR: FUNCTIONAL
JUDGMENT_ADEQUATE_SAFELY_COMPLETE_DAILY_ACTIVITIES: YES
BATHING: INDEPENDENT
TOILETING: NEEDS ASSISTANCE
PATIENT'S MEMORY ADEQUATE TO SAFELY COMPLETE DAILY ACTIVITIES?: YES
HEARING - RIGHT EAR: FUNCTIONAL
FEEDING YOURSELF: INDEPENDENT
DRESSING YOURSELF: INDEPENDENT
LACK_OF_TRANSPORTATION: PATIENT DECLINED
ADEQUATE_TO_COMPLETE_ADL: YES

## 2024-04-19 ASSESSMENT — PAIN DESCRIPTION - LOCATION: LOCATION: HIP

## 2024-04-19 ASSESSMENT — ENCOUNTER SYMPTOMS
GASTROINTESTINAL NEGATIVE: 1
PSYCHIATRIC NEGATIVE: 1
RESPIRATORY NEGATIVE: 1
EYES NEGATIVE: 1
CARDIOVASCULAR NEGATIVE: 1
MUSCULOSKELETAL NEGATIVE: 1
CONSTITUTIONAL NEGATIVE: 1
NEUROLOGICAL NEGATIVE: 1

## 2024-04-19 ASSESSMENT — LIFESTYLE VARIABLES
HOW OFTEN DURING THE LAST YEAR HAVE YOU FAILED TO DO WHAT WAS NORMALLY EXPECTED FROM YOU BECAUSE OF DRINKING: NEVER
HOW MANY STANDARD DRINKS CONTAINING ALCOHOL DO YOU HAVE ON A TYPICAL DAY: 1 OR 2
AUDIT-C TOTAL SCORE: 2
HOW OFTEN DURING THE LAST YEAR HAVE YOU BEEN UNABLE TO REMEMBER WHAT HAPPENED THE NIGHT BEFORE BECAUSE YOU HAD BEEN DRINKING: NEVER
HOW OFTEN DO YOU HAVE 6 OR MORE DRINKS ON ONE OCCASION: NEVER
HOW OFTEN DO YOU HAVE A DRINK CONTAINING ALCOHOL: 2-4 TIMES A MONTH
SKIP TO QUESTIONS 9-10: 1
HOW OFTEN DURING THE LAST YEAR HAVE YOU HAD A FEELING OF GUILT OR REMORSE AFTER DRINKING: NEVER
HOW OFTEN DO YOU HAVE A DRINK CONTAINING ALCOHOL: 2-4 TIMES A MONTH
HOW MANY STANDARD DRINKS CONTAINING ALCOHOL DO YOU HAVE ON A TYPICAL DAY: 1 OR 2
HAVE YOU OR SOMEONE ELSE BEEN INJURED AS A RESULT OF YOUR DRINKING: NO
SUBSTANCE_ABUSE_PAST_12_MONTHS: NO
SKIP TO QUESTIONS 9-10: 1
HOW OFTEN DURING THE LAST YEAR HAVE YOU FOUND THAT YOU WERE NOT ABLE TO STOP DRINKING ONCE YOU HAD STARTED: NEVER
HOW OFTEN DURING THE LAST YEAR HAVE YOU NEEDED AN ALCOHOLIC DRINK FIRST THING IN THE MORNING TO GET YOURSELF GOING AFTER A NIGHT OF HEAVY DRINKING: NEVER
HAS A RELATIVE, FRIEND, DOCTOR, OR ANOTHER HEALTH PROFESSIONAL EXPRESSED CONCERN ABOUT YOUR DRINKING OR SUGGESTED YOU CUT DOWN: NO
AUDIT TOTAL SCORE: 2
PRESCIPTION_ABUSE_PAST_12_MONTHS: NO
HOW OFTEN DO YOU HAVE SIX OR MORE DRINKS ON ONE OCCASION: NEVER

## 2024-04-19 ASSESSMENT — COLUMBIA-SUICIDE SEVERITY RATING SCALE - C-SSRS
6. HAVE YOU EVER DONE ANYTHING, STARTED TO DO ANYTHING, OR PREPARED TO DO ANYTHING TO END YOUR LIFE?: NO
1. IN THE PAST MONTH, HAVE YOU WISHED YOU WERE DEAD OR WISHED YOU COULD GO TO SLEEP AND NOT WAKE UP?: NO
2. HAVE YOU ACTUALLY HAD ANY THOUGHTS OF KILLING YOURSELF?: NO

## 2024-04-19 ASSESSMENT — COGNITIVE AND FUNCTIONAL STATUS - GENERAL
MOBILITY SCORE: 12
MOVING FROM LYING ON BACK TO SITTING ON SIDE OF FLAT BED WITH BEDRAILS: A LITTLE
DRESSING REGULAR LOWER BODY CLOTHING: A LITTLE
MOVING FROM LYING ON BACK TO SITTING ON SIDE OF FLAT BED WITH BEDRAILS: A LITTLE
MOBILITY SCORE: 12
TURNING FROM BACK TO SIDE WHILE IN FLAT BAD: A LITTLE
TOILETING: A LITTLE
STANDING UP FROM CHAIR USING ARMS: A LOT
DAILY ACTIVITIY SCORE: 20
DRESSING REGULAR LOWER BODY CLOTHING: A LITTLE
TOILETING: A LITTLE
TURNING FROM BACK TO SIDE WHILE IN FLAT BAD: A LITTLE
WALKING IN HOSPITAL ROOM: TOTAL
WALKING IN HOSPITAL ROOM: TOTAL
PATIENT BASELINE BEDBOUND: NO
CLIMB 3 TO 5 STEPS WITH RAILING: TOTAL
HELP NEEDED FOR BATHING: A LITTLE
MOVING TO AND FROM BED TO CHAIR: A LOT
CLIMB 3 TO 5 STEPS WITH RAILING: TOTAL
STANDING UP FROM CHAIR USING ARMS: A LOT
MOVING TO AND FROM BED TO CHAIR: A LOT
DRESSING REGULAR UPPER BODY CLOTHING: A LITTLE
DRESSING REGULAR UPPER BODY CLOTHING: A LITTLE
DAILY ACTIVITIY SCORE: 20
HELP NEEDED FOR BATHING: A LITTLE

## 2024-04-19 ASSESSMENT — PAIN SCALES - GENERAL
PAINLEVEL_OUTOF10: 6
PAINLEVEL_OUTOF10: 8
PAINLEVEL_OUTOF10: 2

## 2024-04-19 ASSESSMENT — PAIN - FUNCTIONAL ASSESSMENT
PAIN_FUNCTIONAL_ASSESSMENT: 0-10

## 2024-04-19 ASSESSMENT — PATIENT HEALTH QUESTIONNAIRE - PHQ9
SUM OF ALL RESPONSES TO PHQ9 QUESTIONS 1 & 2: 0
1. LITTLE INTEREST OR PLEASURE IN DOING THINGS: NOT AT ALL
2. FEELING DOWN, DEPRESSED OR HOPELESS: NOT AT ALL

## 2024-04-19 ASSESSMENT — PAIN DESCRIPTION - PAIN TYPE: TYPE: ACUTE PAIN

## 2024-04-19 ASSESSMENT — PAIN DESCRIPTION - ORIENTATION: ORIENTATION: LEFT

## 2024-04-19 NOTE — H&P
History Of Present Illness  Starla Carlos is a 68 y.o. female with past medical history of mitral valve prolapse, colonic polyp, laminectomy, lumbar spinal stenosis, presented to the ED after mechanical fall.  Patient reported that she stepped on a rug and fell.  No dizziness lightheadedness chest pain.  No history of fall in the past.  No significant past medical history, no hypertension even though blood pressure is elevated during presentation.  Patient denies history of seizures    Upon presentation to the ED vital signs include blood pressure Initial was 200 x 78 but improved to 124/56, heart rate 82, saturating 98% on room air, temperature 36.7, labs CBC unremarkable BMP unremarkable initial troponin negative on imaging CT of lumbar spine was done and showed degenerative disc disease L5-S1, L2-L3 left lateral disc herniation with no compression to L2 nerve root, L5-S1 right laminectomy, CT of the pelvis showed impacted subcapital fracture of the left femoral neck patient was admitted for further orthopedic evaluation and managementEKG was done and showed normal sinus rhythm, rate 62, no significant ST-T wave abnormalities,     Past Medical History  As above    Surgical History  Colonoscopy, laminectomy     Social History  No history of cigarette smoking or drug use no alcohol use    Family History  Family history of colon cancer and non-Hodgkin's lymphoma in the mother, family history of melanoma in the father, non-Hodgkin's lymphoma and a brother with melanoma in paternal grandmother     Allergies  Vancomycin    Review of Systems   Constitutional: Negative.    HENT: Negative.     Eyes: Negative.    Respiratory: Negative.     Cardiovascular: Negative.    Gastrointestinal: Negative.    Genitourinary: Negative.    Musculoskeletal: Negative.    Skin: Negative.    Neurological: Negative.    Psychiatric/Behavioral: Negative.          Physical Exam  Constitutional:       General: She is not in acute distress.      "Appearance: Normal appearance. She is not ill-appearing, toxic-appearing or diaphoretic.   HENT:      Head: Normocephalic and atraumatic.      Mouth/Throat:      Mouth: Mucous membranes are moist.      Pharynx: No oropharyngeal exudate.   Eyes:      Extraocular Movements: Extraocular movements intact.      Pupils: Pupils are equal, round, and reactive to light.   Cardiovascular:      Rate and Rhythm: Normal rate and regular rhythm.      Heart sounds: No murmur heard.  Pulmonary:      Effort: Pulmonary effort is normal. No respiratory distress.      Breath sounds: Normal breath sounds. No wheezing.   Abdominal:      General: Abdomen is flat. Bowel sounds are normal. There is no distension.      Tenderness: There is no abdominal tenderness. There is no guarding or rebound.   Musculoskeletal:         General: No swelling.      Right lower leg: No edema.      Left lower leg: No edema.   Skin:     Findings: No lesion or rash.   Neurological:      General: No focal deficit present.      Mental Status: She is alert and oriented to person, place, and time.      Cranial Nerves: No cranial nerve deficit.      Motor: No weakness.   Psychiatric:         Mood and Affect: Mood normal.         Behavior: Behavior normal.          Last Recorded Vitals  Blood pressure 124/56, pulse 76, temperature 36.7 °C (98.1 °F), temperature source Temporal, resp. rate 18, height 1.651 m (5' 5\"), weight 68 kg (150 lb), SpO2 98%.    Relevant Results      Results for orders placed or performed during the hospital encounter of 04/19/24 (from the past 24 hour(s))   ECG 12 lead   Result Value Ref Range    Ventricular Rate 62 BPM    Atrial Rate 62 BPM    MN Interval 158 ms    QRS Duration 94 ms    QT Interval 404 ms    QTC Calculation(Bazett) 410 ms    P Axis 57 degrees    R Axis 29 degrees    T Axis 24 degrees    QRS Count 10 beats    Q Onset 221 ms    P Onset 142 ms    P Offset 192 ms    T Offset 423 ms    QTC Fredericia 408 ms   CBC and Auto " Differential   Result Value Ref Range    WBC 5.9 4.4 - 11.3 x10*3/uL    nRBC 0.0 0.0 - 0.0 /100 WBCs    RBC 4.18 4.00 - 5.20 x10*6/uL    Hemoglobin 13.2 12.0 - 16.0 g/dL    Hematocrit 39.1 36.0 - 46.0 %    MCV 94 80 - 100 fL    MCH 31.6 26.0 - 34.0 pg    MCHC 33.8 32.0 - 36.0 g/dL    RDW 13.4 11.5 - 14.5 %    Platelets 145 (L) 150 - 450 x10*3/uL    Neutrophils % 65.4 40.0 - 80.0 %    Immature Granulocytes %, Automated 0.5 0.0 - 0.9 %    Lymphocytes % 26.8 13.0 - 44.0 %    Monocytes % 6.3 2.0 - 10.0 %    Eosinophils % 0.7 0.0 - 6.0 %    Basophils % 0.3 0.0 - 2.0 %    Neutrophils Absolute 3.86 1.20 - 7.70 x10*3/uL    Immature Granulocytes Absolute, Automated 0.03 0.00 - 0.70 x10*3/uL    Lymphocytes Absolute 1.58 1.20 - 4.80 x10*3/uL    Monocytes Absolute 0.37 0.10 - 1.00 x10*3/uL    Eosinophils Absolute 0.04 0.00 - 0.70 x10*3/uL    Basophils Absolute 0.02 0.00 - 0.10 x10*3/uL   Basic metabolic panel   Result Value Ref Range    Glucose 85 74 - 99 mg/dL    Sodium 139 136 - 145 mmol/L    Potassium 3.7 3.5 - 5.3 mmol/L    Chloride 106 98 - 107 mmol/L    Bicarbonate 25 21 - 32 mmol/L    Anion Gap 12 10 - 20 mmol/L    Urea Nitrogen 18 6 - 23 mg/dL    Creatinine 0.93 0.50 - 1.05 mg/dL    eGFR 68 >60 mL/min/1.73m*2    Calcium 9.4 8.6 - 10.3 mg/dL   Protime-INR   Result Value Ref Range    Protime 11.7 9.8 - 12.8 seconds    INR 1.0 0.9 - 1.1   aPTT   Result Value Ref Range    aPTT 34 27 - 38 seconds   Troponin I, High Sensitivity, Initial   Result Value Ref Range    Troponin I, High Sensitivity 11 0 - 13 ng/L          Assessment/Plan   Active Problems:  There are no active Hospital Problems.      #Mechanical fall  #Left femoral neck fracture  #History of degenerative disc disease  #Status postlaminectomy in the past    Admit to medical floor  Pain control with oxycodone and morphine  Bowel regimen  Orthopedics consult, further plan pdx  Resume home medications  PT/OT  N.p.o. midnight    DVT prophylaxis  Rose Mary Faith MD

## 2024-04-19 NOTE — ED PROVIDER NOTES
HPI   Chief Complaint   Patient presents with    Back Pain     Fall on to left hip, shoot pains down left leg       Pt presents to the ED with c/o left hip pain. States she suffered a mechanical fall just prior to arrival. She slipped and fell from her rug. C/o pain in the left hip. States she had to crawl to call out to her  and has been unable to stand or bear weight on the extremities. No prior pelvic surgeries but reports L5/S1 right-sided discectomy and laminectomy.  States that she has minimal numbness in the right foot.  Denies any nausea or vomiting.  No complaints of head injury or loss of consciousness.  Not on anticoagulation.  She states that she feels very anxious about her symptoms and pain currently.  Prior to the fall, she denied any symptoms and states that it was mechanical in nature she slipped on the rug.                          Shiva Coma Scale Score: 15                     Patient History   No past medical history on file.  No past surgical history on file.  No family history on file.  Social History     Tobacco Use    Smoking status: Not on file    Smokeless tobacco: Not on file   Substance Use Topics    Alcohol use: Not on file    Drug use: Not on file       Physical Exam   ED Triage Vitals [04/19/24 1406]   Temperature Heart Rate Respirations BP   36.7 °C (98.1 °F) 82 18 (!) 200/78      Pulse Ox Temp Source Heart Rate Source Patient Position   98 % Temporal -- Lying      BP Location FiO2 (%)     Right arm --       Physical Exam  Constitutional:       General: She is not in acute distress.     Appearance: She is not toxic-appearing.   HENT:      Head: Normocephalic and atraumatic.      Comments: No Cook sign or raccoon eyes     Right Ear: External ear normal.      Left Ear: External ear normal.   Eyes:      Extraocular Movements: Extraocular movements intact.      Pupils: Pupils are equal, round, and reactive to light.   Cardiovascular:      Rate and Rhythm: Normal rate and  regular rhythm.   Pulmonary:      Effort: Pulmonary effort is normal.      Breath sounds: No wheezing or rales.   Abdominal:      Tenderness: There is no abdominal tenderness. There is no guarding or rebound.   Musculoskeletal:         General: Tenderness present. No deformity.      Comments: No length discrepancy in the bilateral lower extremities.    2+ DP and PT bilaterally.  Capillary refill less than 2 seconds in the bilateral lower extremities.  Soft compartments.    Reproducible tenderness with palpation over the left lateral and posterior hip.   Skin:     Capillary Refill: Capillary refill takes less than 2 seconds.   Neurological:      General: No focal deficit present.      Mental Status: She is alert and oriented to person, place, and time. Mental status is at baseline.      Comments: 5 out of 5 dorsiflexion plantarflexion bilaterally in the lower extremities.    Limited flexion/extension of the left hip secondary to pain         ED Course & MDM   Diagnoses as of 04/19/24 1558   Closed subcapital fracture of left femur, initial encounter (Multi)       Medical Decision Making  Plan for Valium 2 mg IV from a pain and muscle cram point of view in addition to Toradol 30 mg IV from a pain point of view.  Will obtain CT of the pelvis and L-spine    I see evidence of left femoral neck fx with shortening. Added on CT 3-D recon and plain film xrays of femur/hip. Admitted to medicine service. Pre-op labs added. Pt remains neurovascularly intact. Medicine to discuss case with ortho    EKG interpreted by myself shows a sinus rhythm with heart rate 62, , QRS 94 and QTc 410.  Shows normal axis.  No STEMI criteria noted.    Pain well controlled. Advised pt and RN to keep pain non-weightbearing.     Procedure  Procedures     Raad Richard MD  04/19/24 3091       Raad Richard MD  04/19/24 5324

## 2024-04-20 ENCOUNTER — APPOINTMENT (OUTPATIENT)
Dept: RADIOLOGY | Facility: HOSPITAL | Age: 69
DRG: 482 | End: 2024-04-20
Payer: MEDICARE

## 2024-04-20 ENCOUNTER — ANESTHESIA (OUTPATIENT)
Dept: OPERATING ROOM | Facility: HOSPITAL | Age: 69
DRG: 482 | End: 2024-04-20
Payer: MEDICARE

## 2024-04-20 ENCOUNTER — ANESTHESIA EVENT (OUTPATIENT)
Dept: OPERATING ROOM | Facility: HOSPITAL | Age: 69
DRG: 482 | End: 2024-04-20
Payer: MEDICARE

## 2024-04-20 PROBLEM — I34.1 MITRAL VALVE PROLAPSE: Status: ACTIVE | Noted: 2024-04-20

## 2024-04-20 PROBLEM — I34.1 MITRAL VALVE PROLAPSE DETERMINED BY IMAGING: Status: RESOLVED | Noted: 2024-04-20 | Resolved: 2024-04-20

## 2024-04-20 PROBLEM — I34.1 MITRAL VALVE PROLAPSE DETERMINED BY IMAGING: Status: ACTIVE | Noted: 2024-04-20

## 2024-04-20 PROBLEM — M80.059D HIP FRACTURE DUE TO OSTEOPOROSIS WITH ROUTINE HEALING: Status: ACTIVE | Noted: 2024-04-20

## 2024-04-20 LAB
ATRIAL RATE: 62 BPM
P AXIS: 57 DEGREES
P OFFSET: 192 MS
P ONSET: 142 MS
PR INTERVAL: 158 MS
Q ONSET: 221 MS
QRS COUNT: 10 BEATS
QRS DURATION: 94 MS
QT INTERVAL: 404 MS
QTC CALCULATION(BAZETT): 410 MS
QTC FREDERICIA: 408 MS
R AXIS: 29 DEGREES
T AXIS: 24 DEGREES
T OFFSET: 423 MS
VENTRICULAR RATE: 62 BPM

## 2024-04-20 PROCEDURE — 99232 SBSQ HOSP IP/OBS MODERATE 35: CPT | Performed by: STUDENT IN AN ORGANIZED HEALTH CARE EDUCATION/TRAINING PROGRAM

## 2024-04-20 PROCEDURE — 73501 X-RAY EXAM HIP UNI 1 VIEW: CPT | Mod: LEFT SIDE | Performed by: RADIOLOGY

## 2024-04-20 PROCEDURE — 2780000003 HC OR 278 NO HCPCS: Performed by: ORTHOPAEDIC SURGERY

## 2024-04-20 PROCEDURE — 2500000001 HC RX 250 WO HCPCS SELF ADMINISTERED DRUGS (ALT 637 FOR MEDICARE OP): Performed by: STUDENT IN AN ORGANIZED HEALTH CARE EDUCATION/TRAINING PROGRAM

## 2024-04-20 PROCEDURE — 2500000001 HC RX 250 WO HCPCS SELF ADMINISTERED DRUGS (ALT 637 FOR MEDICARE OP): Performed by: PHYSICIAN ASSISTANT

## 2024-04-20 PROCEDURE — 3600000004 HC OR TIME - INITIAL BASE CHARGE - PROCEDURE LEVEL FOUR: Performed by: ORTHOPAEDIC SURGERY

## 2024-04-20 PROCEDURE — 2720000007 HC OR 272 NO HCPCS: Performed by: ORTHOPAEDIC SURGERY

## 2024-04-20 PROCEDURE — 2500000004 HC RX 250 GENERAL PHARMACY W/ HCPCS (ALT 636 FOR OP/ED): Performed by: STUDENT IN AN ORGANIZED HEALTH CARE EDUCATION/TRAINING PROGRAM

## 2024-04-20 PROCEDURE — 7100000001 HC RECOVERY ROOM TIME - INITIAL BASE CHARGE: Performed by: ORTHOPAEDIC SURGERY

## 2024-04-20 PROCEDURE — 27236 TREAT THIGH FRACTURE: CPT | Performed by: ORTHOPAEDIC SURGERY

## 2024-04-20 PROCEDURE — 2500000004 HC RX 250 GENERAL PHARMACY W/ HCPCS (ALT 636 FOR OP/ED): Performed by: PHYSICIAN ASSISTANT

## 2024-04-20 PROCEDURE — 73501 X-RAY EXAM HIP UNI 1 VIEW: CPT | Mod: LT

## 2024-04-20 PROCEDURE — 96372 THER/PROPH/DIAG INJ SC/IM: CPT | Performed by: STUDENT IN AN ORGANIZED HEALTH CARE EDUCATION/TRAINING PROGRAM

## 2024-04-20 PROCEDURE — 2500000004 HC RX 250 GENERAL PHARMACY W/ HCPCS (ALT 636 FOR OP/ED): Mod: JZ | Performed by: ORTHOPAEDIC SURGERY

## 2024-04-20 PROCEDURE — 3700000002 HC GENERAL ANESTHESIA TIME - EACH INCREMENTAL 1 MINUTE: Performed by: ORTHOPAEDIC SURGERY

## 2024-04-20 PROCEDURE — C1713 ANCHOR/SCREW BN/BN,TIS/BN: HCPCS | Performed by: ORTHOPAEDIC SURGERY

## 2024-04-20 PROCEDURE — G0378 HOSPITAL OBSERVATION PER HR: HCPCS

## 2024-04-20 PROCEDURE — 2500000004 HC RX 250 GENERAL PHARMACY W/ HCPCS (ALT 636 FOR OP/ED): Performed by: ORTHOPAEDIC SURGERY

## 2024-04-20 PROCEDURE — 7100000002 HC RECOVERY ROOM TIME - EACH INCREMENTAL 1 MINUTE: Performed by: ORTHOPAEDIC SURGERY

## 2024-04-20 PROCEDURE — 2500000005 HC RX 250 GENERAL PHARMACY W/O HCPCS: Performed by: STUDENT IN AN ORGANIZED HEALTH CARE EDUCATION/TRAINING PROGRAM

## 2024-04-20 PROCEDURE — 7100000011 HC EXTENDED STAY RECOVERY HOURLY - NURSING UNIT

## 2024-04-20 PROCEDURE — 3700000001 HC GENERAL ANESTHESIA TIME - INITIAL BASE CHARGE: Performed by: ORTHOPAEDIC SURGERY

## 2024-04-20 PROCEDURE — 1100000001 HC PRIVATE ROOM DAILY

## 2024-04-20 PROCEDURE — 27236 TREAT THIGH FRACTURE: CPT | Performed by: PHYSICIAN ASSISTANT

## 2024-04-20 PROCEDURE — 3600000009 HC OR TIME - EACH INCREMENTAL 1 MINUTE - PROCEDURE LEVEL FOUR: Performed by: ORTHOPAEDIC SURGERY

## 2024-04-20 PROCEDURE — 99223 1ST HOSP IP/OBS HIGH 75: CPT | Performed by: ORTHOPAEDIC SURGERY

## 2024-04-20 PROCEDURE — C1769 GUIDE WIRE: HCPCS | Performed by: ORTHOPAEDIC SURGERY

## 2024-04-20 DEVICE — IMPLANTABLE DEVICE: Type: IMPLANTABLE DEVICE | Site: HIP | Status: FUNCTIONAL

## 2024-04-20 DEVICE — PLATE, FERMOAL NECK, 1 HOLE, STERILE: Type: IMPLANTABLE DEVICE | Site: HIP | Status: FUNCTIONAL

## 2024-04-20 RX ORDER — BISACODYL 5 MG
10 TABLET, DELAYED RELEASE (ENTERIC COATED) ORAL DAILY PRN
Status: DISCONTINUED | OUTPATIENT
Start: 2024-04-20 | End: 2024-04-21 | Stop reason: HOSPADM

## 2024-04-20 RX ORDER — TRANEXAMIC ACID 650 MG/1
1950 TABLET ORAL ONCE
Status: COMPLETED | OUTPATIENT
Start: 2024-04-21 | End: 2024-04-21

## 2024-04-20 RX ORDER — MORPHINE SULFATE 2 MG/ML
2 INJECTION, SOLUTION INTRAMUSCULAR; INTRAVENOUS EVERY 2 HOUR PRN
Status: DISCONTINUED | OUTPATIENT
Start: 2024-04-20 | End: 2024-04-21 | Stop reason: HOSPADM

## 2024-04-20 RX ORDER — FENTANYL CITRATE 50 UG/ML
25 INJECTION, SOLUTION INTRAMUSCULAR; INTRAVENOUS EVERY 5 MIN PRN
Status: DISCONTINUED | OUTPATIENT
Start: 2024-04-20 | End: 2024-04-20 | Stop reason: HOSPADM

## 2024-04-20 RX ORDER — ROCURONIUM BROMIDE 10 MG/ML
INJECTION, SOLUTION INTRAVENOUS AS NEEDED
Status: DISCONTINUED | OUTPATIENT
Start: 2024-04-20 | End: 2024-04-20

## 2024-04-20 RX ORDER — ACETAMINOPHEN 325 MG/1
650 TABLET ORAL EVERY 6 HOURS SCHEDULED
Status: DISCONTINUED | OUTPATIENT
Start: 2024-04-20 | End: 2024-04-21 | Stop reason: HOSPADM

## 2024-04-20 RX ORDER — PROCHLORPERAZINE MALEATE 5 MG
10 TABLET ORAL EVERY 6 HOURS PRN
Status: DISCONTINUED | OUTPATIENT
Start: 2024-04-20 | End: 2024-04-21 | Stop reason: HOSPADM

## 2024-04-20 RX ORDER — OXYCODONE HYDROCHLORIDE 5 MG/1
5 TABLET ORAL EVERY 4 HOURS PRN
Status: DISCONTINUED | OUTPATIENT
Start: 2024-04-20 | End: 2024-04-21 | Stop reason: HOSPADM

## 2024-04-20 RX ORDER — CYCLOBENZAPRINE HCL 10 MG
10 TABLET ORAL 3 TIMES DAILY PRN
Status: DISCONTINUED | OUTPATIENT
Start: 2024-04-20 | End: 2024-04-21 | Stop reason: HOSPADM

## 2024-04-20 RX ORDER — FENTANYL CITRATE 50 UG/ML
INJECTION, SOLUTION INTRAMUSCULAR; INTRAVENOUS AS NEEDED
Status: DISCONTINUED | OUTPATIENT
Start: 2024-04-20 | End: 2024-04-20

## 2024-04-20 RX ORDER — CEFAZOLIN SODIUM 2 G/100ML
2 INJECTION, SOLUTION INTRAVENOUS EVERY 8 HOURS
Qty: 100 ML | Refills: 0 | Status: COMPLETED | OUTPATIENT
Start: 2024-04-20 | End: 2024-04-21

## 2024-04-20 RX ORDER — LIDOCAINE HYDROCHLORIDE 20 MG/ML
INJECTION, SOLUTION INFILTRATION; PERINEURAL AS NEEDED
Status: DISCONTINUED | OUTPATIENT
Start: 2024-04-20 | End: 2024-04-20

## 2024-04-20 RX ORDER — SODIUM CHLORIDE, SODIUM LACTATE, POTASSIUM CHLORIDE, CALCIUM CHLORIDE 600; 310; 30; 20 MG/100ML; MG/100ML; MG/100ML; MG/100ML
100 INJECTION, SOLUTION INTRAVENOUS CONTINUOUS
Status: DISCONTINUED | OUTPATIENT
Start: 2024-04-20 | End: 2024-04-20 | Stop reason: HOSPADM

## 2024-04-20 RX ORDER — SENNOSIDES 8.6 MG/1
2 TABLET ORAL 2 TIMES DAILY
Status: DISCONTINUED | OUTPATIENT
Start: 2024-04-20 | End: 2024-04-21 | Stop reason: HOSPADM

## 2024-04-20 RX ORDER — PROPOFOL 10 MG/ML
INJECTION, EMULSION INTRAVENOUS AS NEEDED
Status: DISCONTINUED | OUTPATIENT
Start: 2024-04-20 | End: 2024-04-20

## 2024-04-20 RX ORDER — CEFAZOLIN SODIUM 2 G/50ML
2 SOLUTION INTRAVENOUS ONCE
Status: COMPLETED | OUTPATIENT
Start: 2024-04-20 | End: 2024-04-20

## 2024-04-20 RX ORDER — ONDANSETRON HYDROCHLORIDE 2 MG/ML
4 INJECTION, SOLUTION INTRAVENOUS EVERY 8 HOURS PRN
Status: DISCONTINUED | OUTPATIENT
Start: 2024-04-20 | End: 2024-04-21 | Stop reason: HOSPADM

## 2024-04-20 RX ORDER — KETOROLAC TROMETHAMINE 30 MG/ML
15 INJECTION, SOLUTION INTRAMUSCULAR; INTRAVENOUS EVERY 6 HOURS
Status: COMPLETED | OUTPATIENT
Start: 2024-04-20 | End: 2024-04-21

## 2024-04-20 RX ORDER — DIPHENHYDRAMINE HCL 25 MG
25 TABLET ORAL EVERY 6 HOURS PRN
Status: DISCONTINUED | OUTPATIENT
Start: 2024-04-20 | End: 2024-04-21 | Stop reason: HOSPADM

## 2024-04-20 RX ORDER — VANCOMYCIN HYDROCHLORIDE 1 G/20ML
INJECTION, POWDER, LYOPHILIZED, FOR SOLUTION INTRAVENOUS DAILY PRN
Status: DISCONTINUED | OUTPATIENT
Start: 2024-04-20 | End: 2024-04-20

## 2024-04-20 RX ORDER — OXYCODONE HYDROCHLORIDE 5 MG/1
10 TABLET ORAL EVERY 4 HOURS PRN
Status: DISCONTINUED | OUTPATIENT
Start: 2024-04-20 | End: 2024-04-21 | Stop reason: HOSPADM

## 2024-04-20 RX ORDER — PROCHLORPERAZINE EDISYLATE 5 MG/ML
10 INJECTION INTRAMUSCULAR; INTRAVENOUS EVERY 6 HOURS PRN
Status: DISCONTINUED | OUTPATIENT
Start: 2024-04-20 | End: 2024-04-21 | Stop reason: HOSPADM

## 2024-04-20 RX ORDER — LABETALOL HYDROCHLORIDE 5 MG/ML
5 INJECTION, SOLUTION INTRAVENOUS ONCE AS NEEDED
Status: DISCONTINUED | OUTPATIENT
Start: 2024-04-20 | End: 2024-04-20 | Stop reason: HOSPADM

## 2024-04-20 RX ORDER — PHENYLEPHRINE HCL IN 0.9% NACL 0.4MG/10ML
SYRINGE (ML) INTRAVENOUS AS NEEDED
Status: DISCONTINUED | OUTPATIENT
Start: 2024-04-20 | End: 2024-04-20

## 2024-04-20 RX ORDER — MIDAZOLAM HYDROCHLORIDE 1 MG/ML
INJECTION, SOLUTION INTRAMUSCULAR; INTRAVENOUS AS NEEDED
Status: DISCONTINUED | OUTPATIENT
Start: 2024-04-20 | End: 2024-04-20

## 2024-04-20 RX ORDER — SODIUM CHLORIDE, SODIUM LACTATE, POTASSIUM CHLORIDE, CALCIUM CHLORIDE 600; 310; 30; 20 MG/100ML; MG/100ML; MG/100ML; MG/100ML
50 INJECTION, SOLUTION INTRAVENOUS CONTINUOUS
Status: ACTIVE | OUTPATIENT
Start: 2024-04-20 | End: 2024-04-21

## 2024-04-20 RX ORDER — BISACODYL 10 MG/1
10 SUPPOSITORY RECTAL DAILY PRN
Status: DISCONTINUED | OUTPATIENT
Start: 2024-04-20 | End: 2024-04-20 | Stop reason: SDUPTHER

## 2024-04-20 RX ORDER — ONDANSETRON 4 MG/1
4 TABLET, FILM COATED ORAL EVERY 8 HOURS PRN
Status: DISCONTINUED | OUTPATIENT
Start: 2024-04-20 | End: 2024-04-21 | Stop reason: HOSPADM

## 2024-04-20 RX ORDER — PROCHLORPERAZINE 25 MG/1
25 SUPPOSITORY RECTAL EVERY 12 HOURS PRN
Status: DISCONTINUED | OUTPATIENT
Start: 2024-04-20 | End: 2024-04-21 | Stop reason: HOSPADM

## 2024-04-20 RX ORDER — ASPIRIN 81 MG/1
81 TABLET ORAL 2 TIMES DAILY
Status: DISCONTINUED | OUTPATIENT
Start: 2024-04-20 | End: 2024-04-21 | Stop reason: HOSPADM

## 2024-04-20 RX ORDER — SODIUM CHLORIDE, SODIUM LACTATE, POTASSIUM CHLORIDE, CALCIUM CHLORIDE 600; 310; 30; 20 MG/100ML; MG/100ML; MG/100ML; MG/100ML
INJECTION, SOLUTION INTRAVENOUS CONTINUOUS PRN
Status: DISCONTINUED | OUTPATIENT
Start: 2024-04-20 | End: 2024-04-20

## 2024-04-20 RX ORDER — ONDANSETRON HYDROCHLORIDE 2 MG/ML
4 INJECTION, SOLUTION INTRAVENOUS ONCE AS NEEDED
Status: DISCONTINUED | OUTPATIENT
Start: 2024-04-20 | End: 2024-04-20 | Stop reason: HOSPADM

## 2024-04-20 RX ADMIN — Medication 100 MCG: at 08:31

## 2024-04-20 RX ADMIN — ONDANSETRON 4 MG: 2 INJECTION, SOLUTION INTRAMUSCULAR; INTRAVENOUS at 09:11

## 2024-04-20 RX ADMIN — DEXAMETHASONE SODIUM PHOSPHATE 4 MG: 4 INJECTION, SOLUTION INTRAMUSCULAR; INTRAVENOUS at 08:17

## 2024-04-20 RX ADMIN — CEFAZOLIN SODIUM 2 G: 2 INJECTION, SOLUTION INTRAVENOUS at 17:03

## 2024-04-20 RX ADMIN — ASPIRIN 81 MG: 81 TABLET, COATED ORAL at 10:55

## 2024-04-20 RX ADMIN — FENTANYL CITRATE 50 MCG: 50 INJECTION, SOLUTION INTRAMUSCULAR; INTRAVENOUS at 09:11

## 2024-04-20 RX ADMIN — SODIUM CHLORIDE, POTASSIUM CHLORIDE, SODIUM LACTATE AND CALCIUM CHLORIDE 50 ML/HR: 600; 310; 30; 20 INJECTION, SOLUTION INTRAVENOUS at 11:13

## 2024-04-20 RX ADMIN — STANDARDIZED SENNA CONCENTRATE 17.2 MG: 8.6 TABLET ORAL at 10:55

## 2024-04-20 RX ADMIN — SODIUM CHLORIDE, SODIUM LACTATE, POTASSIUM CHLORIDE, AND CALCIUM CHLORIDE: 600; 310; 30; 20 INJECTION, SOLUTION INTRAVENOUS at 08:04

## 2024-04-20 RX ADMIN — ENOXAPARIN SODIUM 40 MG: 40 INJECTION SUBCUTANEOUS at 18:41

## 2024-04-20 RX ADMIN — ROCURONIUM BROMIDE 10 MG: 10 SOLUTION INTRAVENOUS at 08:41

## 2024-04-20 RX ADMIN — FENTANYL CITRATE 50 MCG: 50 INJECTION, SOLUTION INTRAMUSCULAR; INTRAVENOUS at 08:08

## 2024-04-20 RX ADMIN — MIDAZOLAM 2 MG: 1 INJECTION INTRAMUSCULAR; INTRAVENOUS at 08:04

## 2024-04-20 RX ADMIN — KETOROLAC TROMETHAMINE 15 MG: 30 INJECTION, SOLUTION INTRAMUSCULAR at 10:54

## 2024-04-20 RX ADMIN — KETOROLAC TROMETHAMINE 15 MG: 30 INJECTION, SOLUTION INTRAMUSCULAR at 23:10

## 2024-04-20 RX ADMIN — PROPOFOL 150 MG: 10 INJECTION, EMULSION INTRAVENOUS at 08:08

## 2024-04-20 RX ADMIN — SODIUM CHLORIDE, POTASSIUM CHLORIDE, SODIUM LACTATE AND CALCIUM CHLORIDE 50 ML/HR: 600; 310; 30; 20 INJECTION, SOLUTION INTRAVENOUS at 23:10

## 2024-04-20 RX ADMIN — LIDOCAINE HYDROCHLORIDE 60 MG: 20 INJECTION, SOLUTION INFILTRATION; PERINEURAL at 08:08

## 2024-04-20 RX ADMIN — ASPIRIN 81 MG: 81 TABLET, COATED ORAL at 21:32

## 2024-04-20 RX ADMIN — ACETAMINOPHEN 650 MG: 325 TABLET ORAL at 10:55

## 2024-04-20 RX ADMIN — KETOROLAC TROMETHAMINE 15 MG: 30 INJECTION, SOLUTION INTRAMUSCULAR at 17:04

## 2024-04-20 RX ADMIN — SUGAMMADEX 200 MG: 100 INJECTION, SOLUTION INTRAVENOUS at 09:20

## 2024-04-20 RX ADMIN — Medication 100 MCG: at 08:53

## 2024-04-20 RX ADMIN — FENTANYL CITRATE 50 MCG: 50 INJECTION, SOLUTION INTRAMUSCULAR; INTRAVENOUS at 08:41

## 2024-04-20 RX ADMIN — Medication 100 MCG: at 08:37

## 2024-04-20 RX ADMIN — OXYCODONE HYDROCHLORIDE 5 MG: 5 TABLET ORAL at 09:55

## 2024-04-20 RX ADMIN — ROCURONIUM BROMIDE 50 MG: 10 SOLUTION INTRAVENOUS at 08:08

## 2024-04-20 RX ADMIN — DOCUSATE SODIUM 100 MG: 100 CAPSULE, LIQUID FILLED ORAL at 21:32

## 2024-04-20 RX ADMIN — STANDARDIZED SENNA CONCENTRATE 17.2 MG: 8.6 TABLET ORAL at 21:31

## 2024-04-20 RX ADMIN — ACETAMINOPHEN 650 MG: 325 TABLET ORAL at 18:42

## 2024-04-20 RX ADMIN — CEFAZOLIN SODIUM 2 G: 2 SOLUTION INTRAVENOUS at 08:17

## 2024-04-20 SDOH — HEALTH STABILITY: MENTAL HEALTH: CURRENT SMOKER: 0

## 2024-04-20 ASSESSMENT — PAIN SCALES - GENERAL
PAINLEVEL_OUTOF10: 0 - NO PAIN
PAINLEVEL_OUTOF10: 4
PAINLEVEL_OUTOF10: 3
PAINLEVEL_OUTOF10: 0 - NO PAIN

## 2024-04-20 ASSESSMENT — COGNITIVE AND FUNCTIONAL STATUS - GENERAL
TURNING FROM BACK TO SIDE WHILE IN FLAT BAD: A LOT
MOVING TO AND FROM BED TO CHAIR: A LOT
TURNING FROM BACK TO SIDE WHILE IN FLAT BAD: A LITTLE
MOVING FROM LYING ON BACK TO SITTING ON SIDE OF FLAT BED WITH BEDRAILS: A LOT
HELP NEEDED FOR BATHING: A LOT
WALKING IN HOSPITAL ROOM: TOTAL
WALKING IN HOSPITAL ROOM: TOTAL
MOBILITY SCORE: 10
MOVING TO AND FROM BED TO CHAIR: A LOT
STANDING UP FROM CHAIR USING ARMS: A LOT
TOILETING: A LOT
STANDING UP FROM CHAIR USING ARMS: A LOT
MOVING FROM LYING ON BACK TO SITTING ON SIDE OF FLAT BED WITH BEDRAILS: A LITTLE
CLIMB 3 TO 5 STEPS WITH RAILING: TOTAL
HELP NEEDED FOR BATHING: A LITTLE
DAILY ACTIVITIY SCORE: 17
MOBILITY SCORE: 12
DRESSING REGULAR UPPER BODY CLOTHING: A LITTLE
DAILY ACTIVITIY SCORE: 20
DRESSING REGULAR UPPER BODY CLOTHING: A LITTLE
TOILETING: A LITTLE
DRESSING REGULAR LOWER BODY CLOTHING: A LOT
DRESSING REGULAR LOWER BODY CLOTHING: A LITTLE
CLIMB 3 TO 5 STEPS WITH RAILING: TOTAL

## 2024-04-20 ASSESSMENT — ENCOUNTER SYMPTOMS: ARTHRALGIAS: 1

## 2024-04-20 ASSESSMENT — PAIN - FUNCTIONAL ASSESSMENT
PAIN_FUNCTIONAL_ASSESSMENT: WONG-BAKER FACES
PAIN_FUNCTIONAL_ASSESSMENT: 0-10
PAIN_FUNCTIONAL_ASSESSMENT: 0-10

## 2024-04-20 NOTE — CONSULTS
Inpatient consult to Orthopaedic Surgery  Consult performed by: VALERIA Chandler-CNP  Consult ordered by: Jennifer Faith MD        Orthopedics Consult Note    Patient: Starla Carlos  Unit/Bed: 1101/1101-A  YOB: 1955  MRN: [id number]  Acct: [id number]   Admitting Diagnosis: Closed subcapital fracture of left femur, initial encounter (Multi) [S72.012A]  Date:  4/19/2024  Hospital Day: 1  Attending: Jennifer Faith MD         Complaint:  Chief Complaint   Patient presents with    Back Pain     Fall on to left hip, shoot pains down left leg        History of Present Illness:  68-year-old female with past medical history of mitral valve prolapse, colonic polyp, laminectomy, lumbar spinal stenosis presented to the ED after mechanical fall.  Patient reports that she stepped on a rug and fell.  She denies hitting her head or losing consciousness.  Patient denies chest pain, shortness of breath, nausea, vomiting, diarrhea, fever, chills.    ER course: Vital signs were stable and patient was afebrile.  Lab work was unremarkable.  CT of the pelvis showed impacted subcapital fracture of the left femoral neck.  EKG showed normal sinus rhythm with a rate of 62 and no ST elevation.  Patient was admitted to the medicine service and orthopedics was consulted for femoral fracture.    Allergies:  Allergies   Allergen Reactions    Other Unknown     Seasonal Allergies, per pt    Cephalexin Rash        PMHx:  Mitral valve prolapse  Polyp of the colon  Lumbar spine stenosis    PSHx:  Laminectomy    Social Hx:  Social History     Socioeconomic History    Marital status:      Spouse name: Not on file    Number of children: Not on file    Years of education: Not on file    Highest education level: Not on file   Occupational History    Not on file   Tobacco Use    Smoking status: Never    Smokeless tobacco: Never   Vaping Use    Vaping status: Never Used   Substance and Sexual Activity    Alcohol use: Yes      Alcohol/week: 3.0 standard drinks of alcohol     Types: 3 Glasses of wine per week    Drug use: Not on file    Sexual activity: Not on file   Other Topics Concern    Not on file   Social History Narrative    Not on file     Social Determinants of Health     Financial Resource Strain: Patient Declined (4/19/2024)    Overall Financial Resource Strain (CARDIA)     Difficulty of Paying Living Expenses: Patient declined   Food Insecurity: Unknown (10/17/2022)    Received from [facility]    Hunger Vital Sign     Worried About Running Out of Food in the Last Year: Patient declined     Ran Out of Food in the Last Year: Patient declined   Transportation Needs: Patient Declined (4/19/2024)    PRAPARE - Transportation     Lack of Transportation (Medical): Patient declined     Lack of Transportation (Non-Medical): Patient declined   Physical Activity: Unknown (10/17/2022)    Received from [facility]    Exercise Vital Sign     Days of Exercise per Week: Patient declined     Minutes of Exercise per Session: Patient declined   Stress: Unknown (10/17/2022)    Received from [facility]    [place] [employer] - Occupational Stress Questionnaire     Feeling of Stress : Patient declined   Social Connections: Unknown (10/17/2022)    Received from [facility]    Social Connection and Isolation Panel [NHANES]     Frequency of Communication with Friends and Family: Patient declined     Frequency of Social Gatherings with Friends and Family: Patient declined     Attends Spiritism Services: Patient declined     Active Member of Clubs or Organizations: Patient declined     Attends Club or Organization Meetings: Patient declined     Marital Status: Patient declined   Intimate Partner Violence: Not on file   Housing Stability: Patient Declined (4/19/2024)    Housing Stability Vital Sign     Unable to Pay for Housing in the Last Year: Patient declined     Number of Places Lived in the Last Year: 1      Unstable Housing in the Last Year: Patient declined       Family Hx:  No family history on file.    Review of Systems:   Review of Systems   Musculoskeletal:  Positive for arthralgias and gait problem.   All other systems reviewed and are negative.    Physical Examination:    Visit Vitals  /61 (BP Location: Left arm, Patient Position: Lying)   Pulse 76   Temp 36 °C (96.8 °F) (Temporal)   Resp 15      Physical Exam  Constitutional:       General: She is not in acute distress.  Cardiovascular:      Rate and Rhythm: Normal rate.      Pulses: Normal pulses.   Pulmonary:      Effort: Pulmonary effort is normal.   Abdominal:      General: Abdomen is flat.      Palpations: Abdomen is soft.   Musculoskeletal:      Left hip: Deformity and tenderness present.   Skin:     General: Skin is warm and dry.      Capillary Refill: Capillary refill takes less than 2 seconds.   Neurological:      Mental Status: She is alert and oriented to person, place, and time.         LABS:  CBC:   Lab Results   Component Value Date    WBC 5.9 04/19/2024    RBC 4.18 04/19/2024    HGB 13.2 04/19/2024    HCT 39.1 04/19/2024    MCV 94 04/19/2024    MCH 31.6 04/19/2024    MCHC 33.8 04/19/2024    RDW 13.4 04/19/2024     (L) 04/19/2024     CBC with Differential:    Lab Results   Component Value Date    WBC 5.9 04/19/2024    RBC 4.18 04/19/2024    HGB 13.2 04/19/2024    HCT 39.1 04/19/2024     (L) 04/19/2024    MCV 94 04/19/2024    MCH 31.6 04/19/2024    MCHC 33.8 04/19/2024    RDW 13.4 04/19/2024    NRBC 0.0 04/19/2024    LYMPHOPCT 26.8 04/19/2024    MONOPCT 6.3 04/19/2024    EOSPCT 0.7 04/19/2024    BASOPCT 0.3 04/19/2024    MONOSABS 0.37 04/19/2024    LYMPHSABS 1.58 04/19/2024    EOSABS 0.04 04/19/2024    BASOSABS 0.02 04/19/2024     CMP:    Lab Results   Component Value Date     04/19/2024    K 3.7 04/19/2024     04/19/2024    CO2 25 04/19/2024    BUN 18 04/19/2024    CREATININE 0.93 04/19/2024    GLUCOSE 85 04/19/2024  "   CALCIUM 9.4 04/19/2024     BMP:    Lab Results   Component Value Date     04/19/2024    K 3.7 04/19/2024     04/19/2024    CO2 25 04/19/2024    BUN 18 04/19/2024    CREATININE 0.93 04/19/2024    CALCIUM 9.4 04/19/2024    GLUCOSE 85 04/19/2024     Magnesium:No results found for: \"MG\"  Troponin:    Lab Results   Component Value Date    TROPHS 12 04/19/2024    TROPHS 11 04/19/2024       Lipid Panel:  No results found for: \"HDL\", \"CHHDL\", \"VLDL\", \"TRIG\", \"NHDL\"   Current Medications:    Current Facility-Administered Medications:     acetaminophen (Tylenol) tablet 650 mg, 650 mg, oral, q4h PRN **OR** acetaminophen (Tylenol) oral liquid 650 mg, 650 mg, nasogastric tube, q4h PRN **OR** acetaminophen (Tylenol) suppository 650 mg, 650 mg, rectal, q4h PRN, Jennifer Faith MD    acetaminophen (Tylenol) tablet 650 mg, 650 mg, oral, q4h PRN **OR** acetaminophen (Tylenol) oral liquid 650 mg, 650 mg, oral, q4h PRN **OR** acetaminophen (Tylenol) suppository 650 mg, 650 mg, rectal, q4h PRN, Jennifer Faith MD    docusate sodium (Colace) capsule 100 mg, 100 mg, oral, BID, Jennifer Faith MD, 100 mg at 04/19/24 2001    enoxaparin (Lovenox) syringe 40 mg, 40 mg, subcutaneous, q24h, Jennifer Faith MD, 40 mg at 04/19/24 2001    hydrALAZINE (Apresoline) injection 5 mg, 5 mg, intravenous, q6h PRN, Jennifer Faith MD    melatonin tablet 3 mg, 3 mg, oral, Nightly PRN, Jennifer Faith MD    morphine injection 2 mg, 2 mg, intravenous, q4h PRN, Jennifer Faith MD    ondansetron (Zofran) tablet 4 mg, 4 mg, oral, q8h PRN **OR** ondansetron (Zofran) injection 4 mg, 4 mg, intravenous, q8h PRN, Jennifer Faith MD    oxyCODONE-acetaminophen (Percocet) 5-325 mg per tablet 1 tablet, 1 tablet, oral, q6h PRN, Jennifer Faith MD, 1 tablet at 04/19/24 2000    polyethylene glycol (Glycolax, Miralax) packet 17 g, 17 g, oral, Daily PRN, Jennifer Faith MD    XR pelvis 1-2 views    Result Date: " 4/19/2024  Interpreted By:  Jourdan Dallas, STUDY: XR PELVIS 1-2 VIEWS; 4/19/2024 4:21 pm   INDICATION: Signs/Symptoms:subcapital femur fx; Pain   COMPARISON: None available.   ACCESSION NUMBER(S): [id number]   ORDERING CLINICIAN: AMA TEE   TECHNIQUE: Views: AP view of the pelvis.   FINDINGS: Impacted and mildly displaced subcapital fracture of the proximal left femur. The femoral head remains located without dislocation. Right hip joint shows mild degenerative change but otherwise unremarkable. Bony pelvis and SI joints are intact.       Impacted and mildly displaced subcapital fracture of the proximal left femur.   No other osseous abnormalities are identified. The pelvic bones appear intact.   Signed by: Jourdan Dallas 4/19/2024 4:34 PM Dictation workstation:   [id number]    XR femur left 2+ views    Result Date: 4/19/2024  Interpreted By:  Jourdan Dallas, STUDY: XR FEMUR LEFT 2+ VIEWS; ;  4/19/2024 4:21 pm   INDICATION: Signs/Symptoms:subcapital fx, left.   COMPARISON: None.   ACCESSION NUMBER(S): [id number]   ORDERING CLINICIAN: AMA ETE   FINDINGS: Four views of the left femur were obtained. Impacted subcapital fracture of the proximal left femur. The femoral head remains located. No additional fractures are identified.       Impacted subcapital fracture of the proximal left femur. The femoral head remains located. No additional fractures are identified.   MACRO: None   Signed by: Jourdan Dallas 4/19/2024 4:31 PM Dictation workstation:   [id number]    ECG 12 lead    Result Date: 4/19/2024  Normal sinus rhythm Incomplete right bundle branch block Nonspecific ST abnormality Abnormal ECG When compared with ECG of 09-OCT-2012 19:59, Incomplete right bundle branch block is now Present Minimal criteria for Anterior infarct are no longer Present    CT pelvis wo IV contrast    Result Date: 4/19/2024  Interpreted By:  Noreen Delgadillo, STUDY: CT PELVIS WO IV CONTRAST;  4/19/2024 2:47 pm    INDICATION: Signs/Symptoms:left hip pain.   COMPARISON: None.   ACCESSION NUMBER(S): [id number]   ORDERING CLINICIAN: NAKULAID RANDAL   TECHNIQUE: CT of the abdomen and pelvis was performed. Contiguous axial images were obtained at 3 mm slice thickness through the pelvis. Coronal and sagittal reconstructions at 3 mm slice thickness were performed.  No intravenous contrast was administered; positive oral contrast was given.   FINDINGS: PELVIS:   BLADDER: Distended measuring 12.7 x 9.8 x 7.2 cm in longitudinal, transverse and AP dimensions respectively. Bladder volume is 466 cc   REPRODUCTIVE ORGANS: Unremarkable   BOWEL: The visualized bowel is unremarkable   VESSELS: Unremarkable   PERITONEUM/RETROPERITONEUM/LYMPH NODES: Unremarkable   ABDOMINAL WALL: Unremarkable   BONES: There is an impacted fracture of the subcapital region of the left femoral neck. There is a 1.0 cm impaction laterally.       Impacted subcapital fracture left femoral neck.   MACRO: None   Signed by: Noreen Delgadillo 4/19/2024 3:19 PM Dictation workstation:   [id number]    CT lumbar spine wo IV contrast    Result Date: 4/19/2024  Interpreted By:  Noreen Delgadillo, STUDY: CT LUMBAR SPINE WO IV CONTRAST  4/19/2024 2:47 pm   INDICATION: Signs/Symptoms:fall. prior laminectomy at L5/S1 with discectomy   COMPARISON: None.   ACCESSION NUMBER(S): [id number]   ORDERING CLINICIAN: NAKULAID RANDAL   TECHNIQUE: Axial CT images of the lumbar spine are obtained. Axial, coronal and sagittal reconstructions are provided for review.   FINDINGS: There is mild anterior osteophytic spurring at C3-4. There is disc space narrowing, vertebral endplate irregularity and sclerosis, vacuum disc phenomenon, mild anterior and mild posterior osteophytic spurring at L5-S1.   Alignment: Within normal limits.   Vertebrae/Disc Spaces:   The vertebral body heights are intact. The disc spaces are preserved.   Lower Thoracic Spine:  There is no significant central canal stenosis in the  included lower thoracic region.   T12-L1:  There is no significant central canal stenosis.   L1-2:  There is no significant central canal or neural foraminal stenosis.   L2-3:  There is no significant central canal stenosis. There is a broad-based left lateral disc herniation causing no compression on the exiting left L2 nerve root.   L3-4:  There is no significant central canal or neural foraminal stenosis.   L4-5: There mild facet joint hypertrophy. There is no significant central canal or neural foraminal stenosis.   L5-S1: Status post right laminectomy. There is no significant central canal stenosis. There is narrowing of the inferior aspect of the left neural foramen secondary to posterior bony spondylosis but there is no compression on the exiting left L5 nerve root.   Prevertebral/Paraspinal Soft Tissues: The prevertebral and paraspinal soft tissues are unremarkable.       1. No acute bony abnormality lumbar spine. 2. Degenerative disc disease most marked at L5-S1. 3. At L2-3, there is a broad-based left lateral disc herniation causing no compression on the exiting left L2 nerve root. 4. At L5-S1, patient is status post right laminectomy. There is narrowing of the inferior aspect of the left neural foramen but no definite compression on the exiting left L5 nerve root.   MACRO: None   Signed by: Noreen Delgadillo 4/19/2024 3:16 PM Dictation workstation:   [id number]     Assessment:    Patient Active Problem List   Diagnosis    Closed subcapital fracture of left femur, initial encounter (Multi)        Plan:    Left femoral neck fracture  Status post fall  -Presented to the ER after fall.  CT imaging showed left femoral neck fracture.  Risk versus benefits of procedure were discussed with the patient who has consented for the procedure.  She has been n.p.o. since midnight.  -Plan for ORIF of left femur today with Dr. Reyes  -PT/OT postop  -Pain control  -Resume DVT prophylaxis postop      Further plan per   Eric          Electronically signed by KHAI Levy on 4/20/2024 at 7:26 AM     In a face-to-face encounter, I performed a history and physical examination, discussed pertinent diagnostic studies if indicated, and discussed diagnosis and management strategies with both the patient and the midlevel provider.  I reviewed the midlevel's note and agree with the documented findings and plan of care.    Left femoral neck fracture.  Patient severely inhibited with bodily function given her pain and inability to mobilize.  We discussed ORIF versus total hip arthroplasty.  We both agree that trying an ORIF would be the first best step.  She understands if that does not work then she will need a total hip arthroplasty.  She is active healthy and 68 years old.    All of the risks benefits and potential complications of operative versus nonoperative treatment were discussed with the patient.  Operative risks discussed included but were not limited to anesthesia complications, infections, excessive bleeding, damaged to uninjured healthy structures, and failure of surgery requiring the need for reoperation resulting in chronic pain and disability.  The patient completely understands those risks and wished to proceed with operative intervention.    We are going to perform open reduction internal fixation of a left femoral neck fracture.    Kael Reyes MD  Orthopedic surgery

## 2024-04-20 NOTE — OP NOTE
Preoperative diagnosis: Left femoral neck fracture    Postop diagnosis: Same    Procedure: Left hip open reduction internal fixation of a femoral neck fracture    Surgeon: Kael Reyes M.D.    Asst.: Grzegorz HENRIQUEZThe physician assistant was present through the entire case.  Given the nature of the disease process and the procedure to be performed a skilled surgical assistant was necessary during the case.  The assistant was necessary in order to hold retractors and directly assist in the operation.  A certified scrub tech was at the back table managing instruments and supplies for the surgical case.    Anesthesia: Gen.    Blood Loss: 100 cc    Complications: None    HPI: Patient fell and sustained a femoral neck hip fracture.  The patient was scheduled electively for a  open reduction internal fixation of femoral neck fracture.  All of the risks, benefits, and potential complications for operative and nonoperative treatment were discussed at length with the patient.  Risks of operative intervention include, but are not limited to: Anesthesia complications, extensive blood loss, infection, damaged uninjured structures, blood clots, and lack of improvement or worsening of symptoms.  These complications could result in death, permanent disability, or need for reoperation.  The patient completely understood these risks and wished to proceed with operative intervention.    Operative implants: Synthes femoral neck system lateral plate and screw construct    Operative procedure: The patient was wheeled from the preanesthesia care unit to the theater.  The patient was placed in supine position and all bony prominences were well-padded.  For the entire procedure anesthesia maintainined control of the patient's head neck.  General anesthesia was induced.  The patient was placed on the fracture table.  The fractured leg was placed in the fracture leg dyer.  The contralateral leg was placed in a flexed flexed position  and well-padded.  Fracture table was then used to reduce the fracture.  AP lateral and fluoroscopic x-rays were taken to confirm appropriate reduction.  At this point appropriate fracture reduction was obtained.  The operative hip was then prepped and draped in normal sterile fashion.    Timeout was performed, site verification marking was verified, and appropriate antibiotic and stretch was confirmed.  An incision over the lateral aspect of the proximal femur of the fractured hip was performed. Dissection was carried down through skin with a knife. Knife was used to incise through the fascia down to bone. The guide was placed laterally. Using AP x-ray a guidewire was placed up into the center of the femoral head. Lateral x-ray was taken confirm appropriate guidewire positioning as well. The guide was removed.  Depth gauge was used to measure the length of the wire to determine the appropriate length of the implant. Drilling was performed over the guidewire.  The implant was assembled on the back table and then placed up in through the drill hole in the femur. It was tamped up into position. The drill for the derotation screw was drilled and the screw was placed and torqued appropriately. The drill for the hole in the one hole plate was then drilled and measured and placed as well and torqued appropriately. The insertion handle was removed.    Final AP lateral and fluoroscopic x-rays of the entire construct were taken to confirm appropriate nail positioning, helical blade positioning and length, and screw positioning and length.  The wound was irrigated with copious amounts of normal saline the deep fascia was closed with 0 Vicryl.  The fatty layer was closed with 0 Vicryl.  The skin was closed with 2-0 Vicryl and staples.  Sterile dressings were applied.  At the  conclusion of  the case the patient's toes were pink and warm with brisk cap refill.  The patient tolerated the procedure well.          This dictation  was created using voice recognition software.  If there are any questions about inaccuracies please do not hesitate to contact me.

## 2024-04-20 NOTE — ANESTHESIA POSTPROCEDURE EVALUATION
Patient: Starla Carlos    Procedure Summary       Date: 04/20/24 Room / Location: [facility] / [facility]    Anesthesia Start: 0804 Anesthesia Stop: 0927    Procedure: Hip Fracture ORIF w/ Nail Trochanteric (Left: Hip) Diagnosis: (LEFT FEMORAL NECK FRACTRE)    Surgeons: Kael Reyes MD Responsible Provider: Benoit Morales MD    Anesthesia Type: general ASA Status: 2 - Emergent            Anesthesia Type: general    Vitals Value Taken Time   /86 04/20/24 0936   Temp 36.8 04/20/24 0936   Pulse 80 04/20/24 0934   Resp 11 04/20/24 0934   SpO2 98 % 04/20/24 0934   Vitals shown include unfiled device data.    Anesthesia Post Evaluation    Patient location during evaluation: PACU  Patient participation: complete - patient participated  Level of consciousness: awake and alert  Pain management: adequate  Multimodal analgesia pain management approach  Airway patency: patent  Cardiovascular status: acceptable  Respiratory status: acceptable  Hydration status: acceptable  Postoperative Nausea and Vomiting: none        No notable events documented.

## 2024-04-20 NOTE — CARE PLAN
The patient's goals for the shift include Anxiety will be managed.    The clinical goals for the shift include Pt will be free from fall/injury throughout this shift.

## 2024-04-20 NOTE — ANESTHESIA PROCEDURE NOTES
Airway  Date/Time: 4/20/2024 8:12 AM  Urgency: elective    Airway not difficult    Staffing  Performed: attending   Authorized by: Benoit Morales MD    Performed by: Benoit Morales MD  Patient location during procedure: OR    Indications and Patient Condition  Indications for airway management: anesthesia  Spontaneous Ventilation: absent  Sedation level: deep  Preoxygenated: yes  Patient position: sniffing  Mask difficulty assessment: 1 - vent by mask  Planned trial extubation    Final Airway Details  Final airway type: endotracheal airway      Successful airway: ETT  Cuffed: yes   Successful intubation technique: direct laryngoscopy  Endotracheal tube insertion site: oral  Blade: Darryl  Blade size: #4  ETT size (mm): 7.0  Cormack-Lehane Classification: grade I - full view of glottis  Placement verified by: capnometry   Measured from: lips  ETT to lips (cm): 23  Number of attempts at approach: 1

## 2024-04-20 NOTE — CARE PLAN
The patient's goals for the shift include Pain at tolerable level through end of shift    The clinical goals for the shift include patient will have pain controlled with prn pain medications through end of shift    Over the shift, the patient did not make progress toward the following goals:none. Barriers to progression include n/a. Recommendations to address these barriers include continue current plan of care.

## 2024-04-20 NOTE — ANESTHESIA PREPROCEDURE EVALUATION
Starla Carlos is a 68 y.o. female here for:    Hip Fracture ORIF w/ Nail Trochanteric  With Kael Reyes MD  * No Diagnosis Codes entered *    Relevant Problems   Cardiac   (+) Mitral valve prolapse   (+) Mitral valve prolapse determined by imaging (Resolved)      Musculoskeletal   (+) Closed subcapital fracture of left femur, initial encounter (Multi)       Lab Results   Component Value Date    HGB 13.2 04/19/2024    HCT 39.1 04/19/2024    WBC 5.9 04/19/2024     (L) 04/19/2024     04/19/2024    K 3.7 04/19/2024     04/19/2024    CREATININE 0.93 04/19/2024    BUN 18 04/19/2024     EKG 4/2024:  Normal sinus rhythm  Incomplete right bundle branch block  Nonspecific ST abnormality  Abnormal ECG    Social History     Tobacco Use   Smoking Status Never   Smokeless Tobacco Never       Allergies   Allergen Reactions    Other Unknown     Seasonal Allergies, per pt    Cephalexin Rash       Current Outpatient Medications   Medication Instructions    albuterol 90 mcg/actuation inhaler 2 puffs, inhalation, Every 4 hours PRN    cycloSPORINE (Restasis) 0.05 % ophthalmic emulsion 1 drop, Both Eyes, 2 times daily    fexofenadine (ALLEGRA) 180 mg, oral, Daily PRN    neomycin-polymyxin B-dexameth (Polydex) 3.5 mg/g-10,000 unit/g-0.1 % ointment ophthalmic ointment 1 Application, ophthalmic (eye), 2 times daily       No past surgical history on file.    No family history on file.    NPO Details:  No data recorded    Physical Exam    Airway  Mallampati: II  TM distance: >3 FB     Cardiovascular    Dental    Pulmonary    Abdominal            Anesthesia Plan    History of general anesthesia?: yes  History of complications of general anesthesia?: no    ASA 2 - emergent     general     The patient is not a current smoker.    intravenous induction   Postoperative administration of opioids is intended.  Trial extubation is planned.  Anesthetic plan and risks discussed with patient.

## 2024-04-20 NOTE — PROGRESS NOTES
"Daily progress note    Starla Carlos is 68 y.o. female with past medical history of mitral valve prolapse, colonic polyp, laminectomy, lumbar spinal stenosis, presented to the ED after mechanical fall.  X-ray showed left femoral neck fracture and patient underwent open reduction and internal fixation today.    Subjective  Patient was seen and examined at bedside complains of some pain and discomfort over the left hip area otherwise denies any dizziness lightheadedness able to move left leg no numbness        Vital signs in last 24 hours:  Temp:  [36 °C (96.8 °F)-36.8 °C (98.2 °F)] 36.7 °C (98.1 °F)  Heart Rate:  [70-92] 80  Resp:  [10-19] 13  BP: (124-200)/(56-92) 182/82  BP (!) 182/82 (Patient Position: Lying)   Pulse 80   Temp 36.7 °C (98.1 °F) (Temporal)   Resp 13   Ht 1.727 m (5' 8\")   Wt 72.6 kg (160 lb 0.9 oz)   SpO2 95%   BMI 24.34 kg/m²    Intake/Output last 3 shifts:  I/O last 3 completed shifts:  In: 360 (5 mL/kg) [P.O.:360]  Out: - (0 mL/kg)   Weight: 72.6 kg   Intake/Output this shift:  I/O this shift:  In: 649.2 [I.V.:649.2]  Out: -     Physical Exam  Constitutional:       General: She is not in acute distress.     Appearance: Normal appearance. She is not ill-appearing, toxic-appearing or diaphoretic.   HENT:      Head: Normocephalic and atraumatic.      Mouth/Throat:      Mouth: Mucous membranes are moist.      Pharynx: No oropharyngeal exudate.   Eyes:      Extraocular Movements: Extraocular movements intact.      Pupils: Pupils are equal, round, and reactive to light.   Cardiovascular:      Rate and Rhythm: Normal rate and regular rhythm.      Heart sounds: No murmur heard.  Pulmonary:      Effort: Pulmonary effort is normal. No respiratory distress.      Breath sounds: Normal breath sounds. No wheezing.   Abdominal:      General: Abdomen is flat. Bowel sounds are normal. There is no distension.      Tenderness: There is no abdominal tenderness. There is no guarding or rebound. "   Musculoskeletal:         General: No swelling.      Right lower leg: No edema.      Left lower leg: No edema.      Comments: Clean dressing over the left hip.  At the site of surgery   Skin:     Findings: No lesion or rash.   Neurological:      General: No focal deficit present.      Mental Status: She is alert and oriented to person, place, and time.      Cranial Nerves: No cranial nerve deficit.      Motor: No weakness.   Psychiatric:         Mood and Affect: Mood normal.         Behavior: Behavior normal.         Current medication   Current Facility-Administered Medications   Medication Dose Route Frequency Provider Last Rate Last Admin    acetaminophen (Tylenol) tablet 650 mg  650 mg oral q4h PRN Jennifer Faith MD        Or    acetaminophen (Tylenol) oral liquid 650 mg  650 mg nasogastric tube q4h PRN Jennifer Faith MD        Or    acetaminophen (Tylenol) suppository 650 mg  650 mg rectal q4h PRN Jennifer Faith MD        acetaminophen (Tylenol) tablet 650 mg  650 mg oral q6h [facility] Grzegorz Mena PA-C   650 mg at 04/20/24 1055    aspirin EC tablet 81 mg  81 mg oral BID Grzegorz Mena PA-C   81 mg at 04/20/24 1055    benzocaine-menthol (Cepastat Sore Throat) 15-3.6 mg lozenge 1 lozenge  1 lozenge Mouth/Throat q4h PRN Grzegorz Mena PA-C        bisacodyl (Dulcolax) EC tablet 10 mg  10 mg oral Daily PRN Grzegorz Mena PA-C        ceFAZolin in dextrose (iso-os) (Ancef) IVPB 2 g  2 g intravenous q8h Kael Reyes MD        cyclobenzaprine (Flexeril) tablet 10 mg  10 mg oral TID PRN Grzegorz Mena PA-C        diphenhydrAMINE (Sominex) tablet 25 mg  25 mg oral q6h PRN Grzegorz Mena PA-C        docusate sodium (Colace) capsule 100 mg  100 mg oral BID Jennifer Faith MD   100 mg at 04/19/24 2001    enoxaparin (Lovenox) syringe 40 mg  40 mg subcutaneous q24h Jennifer Faith MD   40 mg at 04/19/24 2001    hydrALAZINE (Apresoline) injection 5 mg  5 mg intravenous q6h PRN Jennifer Faith MD         HYDROmorphone (Dilaudid) injection 0.5 mg  0.5 mg intravenous q4h PRN Grzegorz Mena PA-C        ketorolac (Toradol) injection 15 mg  15 mg intravenous q6h Grzegorz Mena PA-C   15 mg at 04/20/24 1054    lactated Ringer's infusion  50 mL/hr intravenous Continuous Grzegorz Mena PA-C 50 mL/hr at 04/20/24 1212 50 mL/hr at 04/20/24 1212    melatonin tablet 3 mg  3 mg oral Nightly PRN Jennifer Faith MD        morphine injection 2 mg  2 mg intravenous q2h PRN Grzegorz Mena PA-C        ondansetron (Zofran) tablet 4 mg  4 mg oral q8h PRN Grzegorz Mena PA-C        Or    ondansetron (Zofran) injection 4 mg  4 mg intravenous q8h PRN Grzegorz Mena PA-C        oxyCODONE (Roxicodone) immediate release tablet 10 mg  10 mg oral q4h PRN Grzegorz Mena PA-C        oxyCODONE (Roxicodone) immediate release tablet 5 mg  5 mg oral q4h PRN Benoit Morales MD   5 mg at 04/20/24 0955    oxyCODONE (Roxicodone) immediate release tablet 5 mg  5 mg oral q4h PRN Grzegorz Mena PA-C        oxyCODONE-acetaminophen (Percocet) 5-325 mg per tablet 1 tablet  1 tablet oral q6h PRN Jennifer Faith MD   1 tablet at 04/19/24 2000    oxygen (O2) therapy  2 L/min inhalation Continuous Grzegorz Mena PA-C   Stopped at 04/20/24 1045    polyethylene glycol (Glycolax, Miralax) packet 17 g  17 g oral Daily PRN Jennifer Faith MD        prochlorperazine (Compazine) tablet 10 mg  10 mg oral q6h PRN Grzegorz Mena PA-C        Or    prochlorperazine (Compazine) injection 10 mg  10 mg intravenous q6h PRN Grzegorz Mena PA-C        Or    prochlorperazine (Compazine) suppository 25 mg  25 mg rectal q12h PRN Grzegorz Mena PA-C        sennosides (Senokot) tablet 17.2 mg  2 tablet oral BID Grzegorz Mena PA-C   17.2 mg at 04/20/24 1055    [START ON 4/21/2024] tranexamic acid (Lysteda) tablet 1,950 mg  1,950 mg oral Once Grzegorz Mena PA-C            Labs  Lab Results   Component Value Date    WBC 5.9 04/19/2024    HGB 13.2 04/19/2024     "HCT 39.1 04/19/2024    MCV 94 04/19/2024     (L) 04/19/2024   No results found for: \"HGBA1C\"  Lab Results   Component Value Date    GLUCOSE 85 04/19/2024    CALCIUM 9.4 04/19/2024     04/19/2024    K 3.7 04/19/2024    CO2 25 04/19/2024     04/19/2024    BUN 18 04/19/2024    CREATININE 0.93 04/19/2024           Principal Problem:    Closed subcapital fracture of left femur, initial encounter (Multi)  Active Problems:    Mitral valve prolapse    Hip fracture due to osteoporosis with routine healing      Assessment and Plan   #Mechanical fall  #Left femoral neck fracture status post ORIF  #History of degenerative disc disease  #Status postlaminectomy in the past  #Elevated blood pressure without prior diagnosis of hypertension      Pain control with oxycodone and morphine  Bowel regimen  Orthopedics on board  PT/OT  Patient underwent ORIF today  Continue home medications  Patient noted to have elevated blood pressure, denies being hypertensive in the past  Hydralazine as needed,     Disposition pending PT OT  DVT prophylaxis Lovenox      LOS: 1 day    "

## 2024-04-21 ENCOUNTER — DOCUMENTATION (OUTPATIENT)
Dept: HOME HEALTH SERVICES | Facility: HOME HEALTH | Age: 69
End: 2024-04-21
Payer: MEDICARE

## 2024-04-21 ENCOUNTER — HOME HEALTH ADMISSION (OUTPATIENT)
Dept: HOME HEALTH SERVICES | Facility: HOME HEALTH | Age: 69
End: 2024-04-21
Payer: MEDICARE

## 2024-04-21 VITALS
WEIGHT: 160.05 LBS | TEMPERATURE: 97.9 F | SYSTOLIC BLOOD PRESSURE: 147 MMHG | HEART RATE: 81 BPM | HEIGHT: 68 IN | DIASTOLIC BLOOD PRESSURE: 71 MMHG | RESPIRATION RATE: 18 BRPM | BODY MASS INDEX: 24.26 KG/M2 | OXYGEN SATURATION: 94 %

## 2024-04-21 LAB
ANION GAP SERPL CALC-SCNC: 12 MMOL/L (ref 10–20)
BUN SERPL-MCNC: 26 MG/DL (ref 6–23)
CALCIUM SERPL-MCNC: 8.7 MG/DL (ref 8.6–10.3)
CHLORIDE SERPL-SCNC: 103 MMOL/L (ref 98–107)
CO2 SERPL-SCNC: 24 MMOL/L (ref 21–32)
CREAT SERPL-MCNC: 1.27 MG/DL (ref 0.5–1.05)
EGFRCR SERPLBLD CKD-EPI 2021: 47 ML/MIN/1.73M*2
ERYTHROCYTE [DISTWIDTH] IN BLOOD BY AUTOMATED COUNT: 13.4 % (ref 11.5–14.5)
GLUCOSE SERPL-MCNC: 132 MG/DL (ref 74–99)
HCT VFR BLD AUTO: 34.1 % (ref 36–46)
HGB BLD-MCNC: 11.4 G/DL (ref 12–16)
HOLD SPECIMEN: NORMAL
MCH RBC QN AUTO: 31.6 PG (ref 26–34)
MCHC RBC AUTO-ENTMCNC: 33.4 G/DL (ref 32–36)
MCV RBC AUTO: 95 FL (ref 80–100)
NRBC BLD-RTO: 0 /100 WBCS (ref 0–0)
PLATELET # BLD AUTO: 128 X10*3/UL (ref 150–450)
POTASSIUM SERPL-SCNC: 4.2 MMOL/L (ref 3.5–5.3)
RBC # BLD AUTO: 3.61 X10*6/UL (ref 4–5.2)
SODIUM SERPL-SCNC: 135 MMOL/L (ref 136–145)
WBC # BLD AUTO: 4.3 X10*3/UL (ref 4.4–11.3)

## 2024-04-21 PROCEDURE — 2500000001 HC RX 250 WO HCPCS SELF ADMINISTERED DRUGS (ALT 637 FOR MEDICARE OP): Performed by: STUDENT IN AN ORGANIZED HEALTH CARE EDUCATION/TRAINING PROGRAM

## 2024-04-21 PROCEDURE — 36415 COLL VENOUS BLD VENIPUNCTURE: CPT | Performed by: PHYSICIAN ASSISTANT

## 2024-04-21 PROCEDURE — 0QS704Z REPOSITION LEFT UPPER FEMUR WITH INTERNAL FIXATION DEVICE, OPEN APPROACH: ICD-10-PCS | Performed by: ORTHOPAEDIC SURGERY

## 2024-04-21 PROCEDURE — 2500000004 HC RX 250 GENERAL PHARMACY W/ HCPCS (ALT 636 FOR OP/ED): Performed by: PHYSICIAN ASSISTANT

## 2024-04-21 PROCEDURE — 2500000006 HC RX 250 W HCPCS SELF ADMINISTERED DRUGS (ALT 637 FOR ALL PAYERS): Performed by: PHYSICIAN ASSISTANT

## 2024-04-21 PROCEDURE — 2500000004 HC RX 250 GENERAL PHARMACY W/ HCPCS (ALT 636 FOR OP/ED): Mod: JZ | Performed by: ORTHOPAEDIC SURGERY

## 2024-04-21 PROCEDURE — 97161 PT EVAL LOW COMPLEX 20 MIN: CPT | Mod: GP | Performed by: PHYSICAL THERAPIST

## 2024-04-21 PROCEDURE — 97110 THERAPEUTIC EXERCISES: CPT | Mod: GP,CQ

## 2024-04-21 PROCEDURE — 2500000001 HC RX 250 WO HCPCS SELF ADMINISTERED DRUGS (ALT 637 FOR MEDICARE OP): Performed by: PHYSICIAN ASSISTANT

## 2024-04-21 PROCEDURE — 7100000011 HC EXTENDED STAY RECOVERY HOURLY - NURSING UNIT

## 2024-04-21 PROCEDURE — 97165 OT EVAL LOW COMPLEX 30 MIN: CPT | Mod: GO

## 2024-04-21 PROCEDURE — 99239 HOSP IP/OBS DSCHRG MGMT >30: CPT | Performed by: STUDENT IN AN ORGANIZED HEALTH CARE EDUCATION/TRAINING PROGRAM

## 2024-04-21 PROCEDURE — 97116 GAIT TRAINING THERAPY: CPT | Mod: GP,CQ

## 2024-04-21 PROCEDURE — 97530 THERAPEUTIC ACTIVITIES: CPT | Mod: GP,CQ

## 2024-04-21 PROCEDURE — 80048 BASIC METABOLIC PNL TOTAL CA: CPT | Performed by: PHYSICIAN ASSISTANT

## 2024-04-21 PROCEDURE — 85027 COMPLETE CBC AUTOMATED: CPT | Performed by: PHYSICIAN ASSISTANT

## 2024-04-21 RX ORDER — ACETAMINOPHEN 500 MG
1000 TABLET ORAL EVERY 6 HOURS PRN
Qty: 30 TABLET | Refills: 0 | Status: SHIPPED | OUTPATIENT
Start: 2024-04-21

## 2024-04-21 RX ORDER — ENOXAPARIN SODIUM 100 MG/ML
40 INJECTION SUBCUTANEOUS EVERY 24 HOURS
Qty: 30 EACH | Refills: 0 | Status: SHIPPED | OUTPATIENT
Start: 2024-04-21 | End: 2024-05-21

## 2024-04-21 RX ADMIN — STANDARDIZED SENNA CONCENTRATE 17.2 MG: 8.6 TABLET ORAL at 08:02

## 2024-04-21 RX ADMIN — KETOROLAC TROMETHAMINE 15 MG: 30 INJECTION, SOLUTION INTRAMUSCULAR at 04:40

## 2024-04-21 RX ADMIN — ACETAMINOPHEN 650 MG: 325 TABLET ORAL at 06:35

## 2024-04-21 RX ADMIN — CEFAZOLIN SODIUM 2 G: 2 INJECTION, SOLUTION INTRAVENOUS at 00:39

## 2024-04-21 RX ADMIN — TRANEXAMIC ACID 1950 MG: 650 TABLET ORAL at 06:36

## 2024-04-21 RX ADMIN — ASPIRIN 81 MG: 81 TABLET, COATED ORAL at 08:02

## 2024-04-21 RX ADMIN — DOCUSATE SODIUM 100 MG: 100 CAPSULE, LIQUID FILLED ORAL at 08:02

## 2024-04-21 RX ADMIN — ACETAMINOPHEN 650 MG: 325 TABLET ORAL at 00:39

## 2024-04-21 ASSESSMENT — COGNITIVE AND FUNCTIONAL STATUS - GENERAL
DAILY ACTIVITIY SCORE: 17
MOVING FROM LYING ON BACK TO SITTING ON SIDE OF FLAT BED WITH BEDRAILS: A LITTLE
CLIMB 3 TO 5 STEPS WITH RAILING: A LITTLE
STANDING UP FROM CHAIR USING ARMS: A LITTLE
CLIMB 3 TO 5 STEPS WITH RAILING: A LITTLE
TOILETING: A LOT
DRESSING REGULAR UPPER BODY CLOTHING: A LITTLE
STANDING UP FROM CHAIR USING ARMS: A LITTLE
TURNING FROM BACK TO SIDE WHILE IN FLAT BAD: A LITTLE
HELP NEEDED FOR BATHING: A LITTLE
DRESSING REGULAR LOWER BODY CLOTHING: A LOT
MOBILITY SCORE: 18
MOBILITY SCORE: 11
MOVING FROM LYING ON BACK TO SITTING ON SIDE OF FLAT BED WITH BEDRAILS: A LITTLE
TURNING FROM BACK TO SIDE WHILE IN FLAT BAD: A LOT
WALKING IN HOSPITAL ROOM: TOTAL
MOVING FROM LYING ON BACK TO SITTING ON SIDE OF FLAT BED WITH BEDRAILS: A LITTLE
DAILY ACTIVITIY SCORE: 21
TOILETING: A LITTLE
HELP NEEDED FOR BATHING: A LOT
CLIMB 3 TO 5 STEPS WITH RAILING: TOTAL
MOVING TO AND FROM BED TO CHAIR: A LITTLE
MOBILITY SCORE: 18
TURNING FROM BACK TO SIDE WHILE IN FLAT BAD: A LITTLE
WALKING IN HOSPITAL ROOM: A LITTLE
MOVING TO AND FROM BED TO CHAIR: A LITTLE
MOVING TO AND FROM BED TO CHAIR: A LOT
STANDING UP FROM CHAIR USING ARMS: A LOT
WALKING IN HOSPITAL ROOM: A LITTLE
DRESSING REGULAR LOWER BODY CLOTHING: A LITTLE

## 2024-04-21 ASSESSMENT — PAIN - FUNCTIONAL ASSESSMENT
PAIN_FUNCTIONAL_ASSESSMENT: 0-10

## 2024-04-21 ASSESSMENT — PAIN DESCRIPTION - LOCATION
LOCATION: HIP
LOCATION: HIP

## 2024-04-21 ASSESSMENT — PAIN SCALES - GENERAL
PAINLEVEL_OUTOF10: 1
PAINLEVEL_OUTOF10: 0 - NO PAIN
PAINLEVEL_OUTOF10: 0 - NO PAIN
PAINLEVEL_OUTOF10: 2
PAINLEVEL_OUTOF10: 3
PAINLEVEL_OUTOF10: 1
PAINLEVEL_OUTOF10: 0 - NO PAIN

## 2024-04-21 ASSESSMENT — ACTIVITIES OF DAILY LIVING (ADL): BATHING_ASSISTANCE: STAND BY

## 2024-04-21 ASSESSMENT — PAIN DESCRIPTION - ORIENTATION: ORIENTATION: LEFT

## 2024-04-21 NOTE — CARE PLAN
The patient's goals for the shift include Pt rogerio state pain is controlled at tolerable level by end of the shift.    The clinical goals for the shift include  Pt will be free from fall/injury throughout the shift.

## 2024-04-21 NOTE — DISCHARGE SUMMARY
Discharge Diagnosis  Closed subcapital fracture of left femur, initial encounter (Multi)  1. Closed subcapital fracture of left femur, initial encounter (Multi)    2. Hip fracture due to osteoporosis with routine healing       Issues Requiring Follow-Up  Follow-up with orthopedics  Follow-up with PCP      Discharge Meds     Your medication list        START taking these medications        Instructions Last Dose Given Next Dose Due   acetaminophen 500 mg tablet  Commonly known as: Tylenol      Take 2 tablets (1,000 mg) by mouth every 6 hours if needed for mild pain (1 - 3).       enoxaparin 40 mg/0.4 mL syringe  Commonly known as: Lovenox      Inject 0.4 mL (40 mg) under the skin once every 24 hours.              CONTINUE taking these medications        Instructions Last Dose Given Next Dose Due   albuterol 90 mcg/actuation inhaler           cycloSPORINE 0.05 % ophthalmic emulsion  Commonly known as: Restasis           fexofenadine 180 mg tablet  Commonly known as: Allegra           neomycin-polymyxin B-dexameth 3.5 mg/g-10,000 unit/g-0.1 % ointment ophthalmic ointment  Commonly known as: Polydex                     Where to Get Your Medications        These medications were sent to [id number] - [place], [place] - [address]  [address] , [facility] [address]      Phone: [phone]   acetaminophen 500 mg tablet  enoxaparin 40 mg/0.4 mL syringe         Test Results Pending At Discharge  Pending Labs       No current pending labs.            Hospital Course  Starla Carlos is 68 y.o. female with past medical history of mitral valve prolapse, colonic polyp, laminectomy, lumbar spinal stenosis, presented to the ED after mechanical fall.  X-ray showed left femoral neck fracture and patient underwent open reduction and internal fixation of left femoral neck fracture.  Patient was seen and evaluated by orthopedics and cleared for discharge today.  Patient wants to be discharged home with home  care.  Discussed with orthopedics regarding prophylactic anticoagulation and recommended Lovenox for 30 days.  Patient did not want opiates for pain only wanted to take Tylenol.  Patient can be discharged home today with home care services.  Discharge plan discussed with the patient and patient's spouse and they are in agreement.  Total discharge time spent 35 minutes.       Pertinent Physical Exam At Time of Discharge  Physical Exam  Constitutional:       General: She is not in acute distress.     Appearance: Normal appearance. She is not ill-appearing, toxic-appearing or diaphoretic.   HENT:      Head: Normocephalic and atraumatic.      Mouth/Throat:      Mouth: Mucous membranes are moist.      Pharynx: No oropharyngeal exudate.   Eyes:      Extraocular Movements: Extraocular movements intact.      Pupils: Pupils are equal, round, and reactive to light.   Cardiovascular:      Rate and Rhythm: Normal rate and regular rhythm.      Heart sounds: No murmur heard.  Pulmonary:      Effort: Pulmonary effort is normal. No respiratory distress.      Breath sounds: Normal breath sounds. No wheezing.   Abdominal:      General: Abdomen is flat. Bowel sounds are normal. There is no distension.      Tenderness: There is no abdominal tenderness. There is no guarding or rebound.   Musculoskeletal:         General: No swelling.      Right lower leg: No edema.      Left lower leg: No edema.   Skin:     Findings: No lesion or rash.   Neurological:      General: No focal deficit present.      Mental Status: She is alert and oriented to person, place, and time.      Cranial Nerves: No cranial nerve deficit.      Motor: No weakness.   Psychiatric:         Mood and Affect: Mood normal.         Behavior: Behavior normal.         Outpatient Follow-Up  No future appointments.   [facility]  [address] [facility] [address] [phone]        Kael Reyes MD  [id number]   [facility], [address] [place] [address] [id number] [phone]    Follow up in 2 week(s)          Jennifer Faith MD

## 2024-04-21 NOTE — PROGRESS NOTES
04/21/24 0918   Discharge Planning   Living Arrangements Spouse/significant other   Support Systems Spouse/significant other;Family members   Assistance Needed PT/OT   Type of Residence Private residence   Number of Stairs to Enter Residence 1  (pt reports home multi levels but able to stay on first floor with master bedroom, bath , laundry)   Who is requesting discharge planning? Provider   Home or Post Acute Services In home services   Type of Home Care Services Home OT;Home PT   Patient expects to be discharged to: Home with [facility]   Does the patient need discharge transport arranged? No  ( to transport)   Patient Choice   Provider Choice list and CMS website (https://medicare.gov/care-compare#search) for post-acute Quality and Resource Measure Data were provided and reviewed with: Patient     Met with pt sitting up in recliner chair in room. S/P Postop day 1 ORIF of left femoral neck fracture.  Pt reports good support at home, she lives with her  & has 2 sisters visiting. Requests to dc home with [facility]. Offered freedom of choice & pt chose to follow with [facility], ortho to follow. Seen by PT/OT this am, eval notes pending. Therapy to return to do steps with pt . Pt to discharge with walker & [facility]. Requested [facility] orders. Updated pt nurse Gayle regarding dc plan.

## 2024-04-21 NOTE — PROGRESS NOTES
Physical Therapy    Physical Therapy Treatment    Patient Name: Starla Carlos  MRN: [id number]  Today's Date: 4/21/2024  Time Calculation  Start Time: 0930  Stop Time: 1028  Time Calculation (min): 58 min       Assessment/Plan   PT Assessment  PT Assessment Results: Decreased endurance, Impaired balance, Decreased mobility, Decreased safety awareness  Rehab Prognosis: Good  End of Session Communication: Bedside nurse  Assessment Comment: Pt eager to participate and to discharge home. Pt will benefit from additional PT services to further progress strength and functional mobility. Pt vended a WW for homegoing.  End of Session Patient Position: Up in chair, Alarm on     PT Plan  Treatment/Interventions: Transfer training, Gait training, Stair training, Strengthening  PT Plan: Skilled PT  PT Frequency: BID  PT Discharge Recommendations:  (HHPT with family support)  Equipment Recommended upon Discharge: Wheeled walker, Crutches  PT Recommended Transfer Status: Assist x1    General Visit Information:   PT  Visit  PT Received On: 04/21/24  General  Reason for Referral: s/p ORIF left femoral neck fracture 4/20/24  Referred By: PT/OT 4/20/24 Trina/Eric [id number]  Past Medical History Relevant to Rehab: MVP  Patient Position Received: Up in chair, Alarm on  General Comment: Pt seated in recliner and eager to participate with PT services so that she may be discharged. Spouse entering during session. (Pt looking for wedding ring, nursing made aware.)    Subjective   Precautions:  Precautions  LE Weight Bearing Status: Left Toe-Touch Weight Bearing  Vital Signs:       Objective   Pain:  Pain Assessment  Pain Assessment: 0-10  Pain Score: 0 - No pain  Cognition:  Cognition  Overall Cognitive Status: Within Functional Limits  Postural Control:     Extremity/Trunk Assessments:        Activity Tolerance:  Activity Tolerance  Endurance: Endurance does not limit participation in activity  Treatments:  Therapeutic Exercise  Therapeutic  Exercise Performed:  (Supine AP, quad set, glute set x 10ea BLE. Pt needing cues to focus on tasks.)    Ambulation/Gait Training  Ambulation/Gait Training Performed:  (Pt ambulating 10' x 6 with both crutches and WW. Pt needing initial education for sequencing with improvement as gait progresses. Pt maintain TTWB with CGA.)  Transfers  Transfer:  (Multiple sit<>stand transfers from chair with WW and crutches with CGA. Min A with crutches initially but good return demo.)    Stairs  Stairs:  (Multiple stair trials with gait belt/ww, 1crutch/1 HR, 2 HR,2 crutches with min A/CGA while maintaining TTWB. Max cues for sequencing. Pt also trials platform step with WW and CGA. Pt climbing ~10 steps total.)    Outcome Measures:  [facility] Basic Mobility  Turning from your back to your side while in a flat bed without using bedrails: A little  Moving from lying on your back to sitting on the side of a flat bed without using bedrails: A little  Moving to and from bed to chair (including a wheelchair): A little  Standing up from a chair using your arms (e.g. wheelchair or bedside chair): A little  To walk in hospital room: A little  Climbing 3-5 steps with railing: A little  Basic Mobility - Total Score: 18    Education Documentation  Handouts, taught by Brenda Kidd PTA at 4/21/2024 12:01 PM.  Learner: Significant Other, Patient  Readiness: Acceptance  Method: Explanation  Response: Verbalizes Understanding    Precautions, taught by Brenda Kidd PTA at 4/21/2024 12:01 PM.  Learner: Significant Other, Patient  Readiness: Acceptance  Method: Explanation  Response: Verbalizes Understanding    Home Exercise Program, taught by Brenda Kidd PTA at 4/21/2024 12:01 PM.  Learner: Significant Other, Patient  Readiness: Acceptance  Method: Explanation  Response: Verbalizes Understanding    Mobility Training, taught by Brenda Kidd PTA at 4/21/2024 12:01 PM.  Learner: Significant Other, Patient  Readiness: Acceptance  Method:  Explanation  Response: Verbalizes Understanding    Education Comments  No comments found.        EDUCATION:  Outpatient Education  Individual(s) Educated: Patient, Spouse  Education Provided: Body Mechanics, Fall Risk, Home Exercise Program, POC, Post-Op Precautions    Encounter Problems       Encounter Problems (Active)       PT Problem       Transfers sit <> stand with ww modified independent  (Progressing)       Start:  04/21/24    Expected End:  05/05/24            Patient to ambulate with ww 100' modified independent  (Progressing)       Start:  04/21/24    Expected End:  05/05/24            Patient to negotiate 2 steps with single handrail and crutch CGA (Met)       Start:  04/21/24    Expected End:  05/05/24    Resolved:  04/21/24         Patient independent in HEP (Progressing)       Start:  04/21/24    Expected End:  05/05/24               Pain - Adult

## 2024-04-21 NOTE — PROGRESS NOTES
Chief complaint: Postop day 1 ORIF of left femoral neck fracture    HPI: Patient doing very well.  She says she has no pain at rest.  She can move her hip around without any pain.  She has no numbness tingling fevers chills.  She wants to go home.    Physical exam: Dressings clean dry and intact.  Neurovascular intact left lower extremity.  Calves soft nontender bilaterally.  Negative Homans bilaterally.    Assessment/plan: Postop day #1 status post ORIF left femoral neck fracture.  Patient should be toe-touch weightbearing and is okay to be discharged home if cleared by the medical team and physical therapy.  She can follow-up in the office in 2 weeks.

## 2024-04-21 NOTE — PROGRESS NOTES
"Occupational Therapy    Evaluation/Treatment    Patient Name: Starla Carlos pt prefers to be called \"Sofía\"  MRN: [id number]  : 1955  Today's Date: 24  Time Calculation  Start Time: 757  Stop Time: 826  Time Calculation (min): 29 min       Assessment:  End of Session Communication: Care Coordinator  End of Session Patient Position: Up in chair, Alarm on (LE;s elevated, call light in reach)  OT Assessment Results: Decreased ADL status, Decreased endurance, Decreased functional mobility    Plan:  Treatment Interventions: ADL retraining, Functional transfer training, Patient/family training  OT Frequency: 2 times per week  OT Discharge Recommendations: Low intensity level of continued care  OT Recommended Transfer Status: Stand by assist  OT - OK to Discharge: Yes  Treatment Interventions: ADL retraining, Functional transfer training, Patient/family training  Subjective     General:   OT Received On: 24  General  Reason for Referral: s/p ORIF left femoral neck fracture 24  Referred By: PT/OT 24 Trina/Eric TTWB  Past Medical History Relevant to Rehab: MVP  Patient Position Received: Bed, 3 rail up, Alarm on  General Comment: To ED 24 with c/o left hip pain, slipped and fell .CT 24 Pelvis subcapital impacted left femoral neck fracture; Lumbar L2,3, left darryl based lateral disc herniation; XR 24 Left femur impacted left subcapital fracture proximal femur    Precautions:  LE Weight Bearing Status:  (L LE TTWB)           Pain:  Pain Assessment  Pain Assessment: 0-10  Pain Score: 0 - No pain  Objective     Cognition:  Overall Cognitive Status: Within Functional Limits (motivated to increase indep with ADLS and mobility)             Home Living:  Home Living Comments: Per patient report resides with spouse in 1 story home 2 steps (curb type) to enter without handrail. Also has 2 steps with handrail into bedroom .Denies any other falls. Drives. May have crutches at home. Walk " in shower with seat and grab bar.    Prior Function:  Prior Function Comments: Per patient report independent in mobility, ADLs and IADLs without assistive device. Denies any other falls. Drives.           Activities of Daily Living:   Eating Assistance: Independent  Grooming Assistance: Independent  Bathing Assistance: Stand by  UE Dressing Assistance: Independent  LE Dressing Assistance: Stand by  LE Dressing Deficit: Thread RLE into pants, Thread LLE into pants, Thread RLE into underwear, Thread LLE into underwear, Pull up over hips  Toileting Assistance with Device: Stand by  Toileting Deficit:  (BS frame over toilet)  Functional Assistance:  (pt ambulated 60' with ww with SBA, good adherence to TTWB L LE)                         Activity Tolerance:  Endurance: Endurance does not limit participation in activity           Bed Mobility/Transfers: Bed Mobility  Bed Mobility:  (Supine > sit with HOB 45 degrees + 1minimal assist for LLE to move toward EOB)  Transfers  Transfer:  (Sit <> stand at bed/recliner and [facility] CGA, vc for hand placement, with good carrythrough.)                Balance:  Static Sitting: good  Dynamic Sitting: good  Static Standing: fair +  Dynamic Standing: fair       Vision:Vision - Basic Assessment  Current Vision: No visual deficits        Sensation:  Sensation Comment: c/o numbess right foot    Strength:  Strength Comments: B UE 4/5 throughout            Extremities: RUE   RUE : Within Functional Limits and LUE   LUE: Within Functional Limits    Outcome Measures: [facility] Daily Activity  Putting on and taking off regular lower body clothing: A little  Bathing (including washing, rinsing, drying): A little  Putting on and taking off regular upper body clothing: None  Toileting, which includes using toilet, bedpan or urinal: A little  Taking care of personal grooming such as brushing teeth: None  Eating Meals: None  Daily Activity - Total Score: 21          EDUCATION:  Education  Education  Comment: OT educated pt on safe toilet transfer, recommendation for washcloth/hand towel on shower seat to increase safety. Instructed in LB dressing strateiges. Pt receptive to education, demo good safety and understanding

## 2024-04-21 NOTE — PROGRESS NOTES
Physical Therapy    Physical Therapy Evaluation    Patient Name: Starla Carlos  MRN: [id number]  Today's Date: 4/21/2024   Time Calculation  Start Time: 0806  Stop Time: 0830  Time Calculation (min): 24 min    Assessment/Plan   PT Assessment  PT Assessment Results: Decreased endurance, Impaired balance, Decreased mobility, Decreased safety awareness  Rehab Prognosis: Good  Evaluation/Treatment Tolerance: Patient tolerated treatment well  End of Session Communication: Care Coordinator  Assessment Comment: Patient will benefit from additonal PT to address stair climbing  End of Session Patient Position: Up in chair, Alarm on (LEs elevated)  IP OR SWING BED PT PLAN  Inpatient or Swing Bed: Inpatient  PT Plan  Treatment/Interventions: Bed mobility, Transfer training, Gait training, Stair training, Endurance training, Therapeutic exercise, Therapeutic activity, Home exercise program  PT Plan: Skilled PT  PT Frequency: BID  PT Discharge Recommendations:  (HHPT with family support)  Equipment Recommended upon Discharge: Wheeled walker, Crutches  PT Recommended Transfer Status: Assist x1  PT - OK to Discharge: (with continued PT and fmaily support once deemed medically appropriate)      Subjective   General Visit Information:  General  Reason for Referral: s/p ORIF left femoral neck fracture 4/20/24  Referred By: PT/OT 4/20/24 Trina/Eric NEWELL  Past Medical History Relevant to Rehab: MVP  Patient Position Received: Bed, 3 rail up, Alarm on  General Comment: To ED 4/19/24 with c/o left hip pain, slipped and fell .CT 4/19/24 Pelvis subcapital impacted left femoral neck fracture; Lumbar L2,3, left darryl based lateral disc herniation; XR 4/19/24 Left femur impacted left subcapital fracture proximal femur  Home Living:  Home Living  Home Living Comments: Per patient report resides with spouse in 1 story home 2 steps (curb type) to enter without handrail. Also has 2 steps with handrail into bedroom .Denies any other falls.  Drives. May have crutches at home. Walk in showe with seat and grab bar.  Prior Level of Function:  Prior Function Per Pt/Caregiver Report  Prior Function Comments: Per patient report independent in mobility, ADLs and IADLs without assistive device. Denies any ohte rfalls. Drives.  Precautions:  Precautions  LE Weight Bearing Status:  (LLE TTWB)      Objective   Pain:  Pain Assessment  Pain Score: 0 - No pain  Cognition:  Cognition  Overall Cognitive Status: Within Functional Limits    General Assessments:  General Observation  General Observation: Patient lying in bed. Agreeable to PT/OT. Patient very excited about working with PT./OT.         Activity Tolerance  Endurance:  (Good)    Static Sitting Balance  Static Sitting-: Good  Dynamic Sitting Balance  Dynamic Sitting- Good    Static Standing Balance  Static Standing-: Fair+  Dynamic Standing BalanceC  Dynamic Standing- Fair+  Functional Assessments:  Bed Mobility  Bed Mobility:  (Supine > sit with HOB 45 degrees + 1minimal assist for LLE to move toward EOB)    Transfers  Transfer:  (Sit <> stand at bed/recliner and BSC CGA, vc for hand placement, with good carrythrough.)    Ambulation/Gait Training  Ambulation/Gait Training Performed:  (Patient ambulated 10' x2 TTWB LLE with ww + 1 minimal assist, tends to overadvance LEs. vc to correct and for proper sequencing. Ambulated another 50' with ww TTWB LLE CGA able to demonstrate improved sequencing and advancement of LE.)  Extremity/Trunk Assessments:  RUE   RUE : Within Functional Limits  LUE   LUE: Within Functional Limits  RLE   RLE :  (AROM Hip/knee/ankle WFL MMT 4/5 grossly)  LLE   LLE :  (AROM Hip/knee/ankle WFL MMT ankle dorsiflexors 4/5)  Outcome Measures:  [facility] Basic Mobility  Turning from your back to your side while in a flat bed without using bedrails: A little  Moving from lying on your back to sitting on the side of a flat bed without using bedrails: A little  Moving to and from bed to chair  (including a wheelchair): A little  Standing up from a chair using your arms (e.g. wheelchair or bedside chair): A little  To walk in hospital room: A little  Climbing 3-5 steps with railing: A little  Basic Mobility - Total Score: 18    Encounter Problems       Encounter Problems (Active)       PT Problem       Transfers sit <> stand with ww modified independent  (Progressing)       Start:  04/21/24    Expected End:  05/05/24            Patient to ambulate with ww 100' modified independent  (Progressing)       Start:  04/21/24    Expected End:  05/05/24            Patient to negotiate 2 steps with single handrail and crutch CGA (Not Progressing)       Start:  04/21/24    Expected End:  05/05/24            Patient independent in HEP (Not Progressing)       Start:  04/21/24    Expected End:  05/05/24               Pain - Adult              Education Documentation  Mobility Training, taught by Chloe Garcia, PT at 4/21/2024 10:23 AM.  Learner: Patient  Readiness: Acceptance  Method: Explanation, Demonstration  Response: Demonstrated Understanding  Comment: TTWB and use of ww

## 2024-04-21 NOTE — CARE PLAN
The patient's goals for the shift include Pain at tolerable level through end of shift    The clinical goals for the shift include patient will participate with PT/OT today through end of shift    Over the shift, the patient did not make progress toward the following goals: none. Barriers to progression include n/a. Recommendations to address these barriers include continue current plan of care.

## 2024-04-21 NOTE — HH CARE COORDINATION
Home Care received a Referral for Physical Therapy and Occupational Therapy. We have processed the referral for a Start of Care on 4/22.     If you have any questions or concerns, please feel free to contact us at [phone]. Follow the prompts, enter your five digit zip code, and you will be directed to your care team on [facility].

## 2024-04-22 ENCOUNTER — HOME CARE VISIT (OUTPATIENT)
Dept: HOME HEALTH SERVICES | Facility: HOME HEALTH | Age: 69
End: 2024-04-22
Payer: MEDICARE

## 2024-04-22 VITALS — WEIGHT: 158 LBS | BODY MASS INDEX: 23.95 KG/M2 | HEIGHT: 68 IN

## 2024-04-22 PROCEDURE — 169592 NO-PAY CLAIM PROCEDURE

## 2024-04-22 PROCEDURE — 1090000001 HH PPS REVENUE CREDIT

## 2024-04-22 PROCEDURE — G0151 HHCP-SERV OF PT,EA 15 MIN: HCPCS | Mod: HHH

## 2024-04-22 PROCEDURE — 0023 HH SOC

## 2024-04-22 PROCEDURE — G0152 HHCP-SERV OF OT,EA 15 MIN: HCPCS | Mod: HHH

## 2024-04-22 PROCEDURE — 1090000002 HH PPS REVENUE DEBIT

## 2024-04-22 ASSESSMENT — ENCOUNTER SYMPTOMS
PAIN LOCATION - EXACERBATING FACTORS: WT BEARING
PAIN LOCATION - PAIN DURATION: BRIEF
PAIN LOCATION: LEFT HIP
PERSON REPORTING PAIN: PATIENT
LOWEST PAIN SEVERITY IN PAST 24 HOURS: 0/10
PAIN LOCATION - RELIEVING FACTORS: NWB
SUBJECTIVE PAIN PROGRESSION: WAXING AND WANING
HIGHEST PAIN SEVERITY IN PAST 24 HOURS: 2/10
PAIN LOCATION - PAIN SEVERITY: 2/10
PAIN: 1
PAIN LOCATION - PAIN QUALITY: DULL
PAIN SEVERITY GOAL: 0/10
PAIN LOCATION - PAIN FREQUENCY: INTERMITTENT

## 2024-04-22 ASSESSMENT — ACTIVITIES OF DAILY LIVING (ADL)
ENTERING_EXITING_HOME: ONE PERSON
OASIS_M1830: 03

## 2024-04-23 ENCOUNTER — TELEPHONE (OUTPATIENT)
Dept: ORTHOPEDIC SURGERY | Facility: CLINIC | Age: 69
End: 2024-04-23
Payer: MEDICARE

## 2024-04-23 PROCEDURE — 1090000001 HH PPS REVENUE CREDIT

## 2024-04-23 PROCEDURE — 1090000002 HH PPS REVENUE DEBIT

## 2024-04-23 SDOH — ECONOMIC STABILITY: HOUSING INSECURITY
HOME SAFETY: F AE TO ASSIST WITH LB ADLS, PROPER TRANSFER TECHNIQUES TO MAINTAIN NWB LLE, DME INSTRUCTION AND EDUCATION.   INSTRUCTION PROVIDED: NWB WITH TRANSFERS , USE OF AE TO ASSIST WTIH LB DRESSING, BENEFITS OF 3:1 COMMODE FRAME, BENEFITS OF REMOVING SHOWER

## 2024-04-23 SDOH — ECONOMIC STABILITY: HOUSING INSECURITY: HOME SAFETY: PT, MD  PLAN FOR NEXT VISIT: ADL RETRAINING, FUNCTIONAL TRANSFER TRAINING

## 2024-04-23 SDOH — ECONOMIC STABILITY: HOUSING INSECURITY
HOME SAFETY: DOORS AND USE OF SHOWER CHAIR WITH ARMS   PATIENT RESPONSE TO INSTRUCTION: VERBALIZES UNDERSTANDING   PATIENT INSTRUCTED ON PLAN OF CARE AND VISIT FREQUENCY. PATIENT IN AGREEMENT WITH PLAN OF CARE AND VISIT FREQUENCY.  2W3  DISCIPLINE COMMUNICATION:

## 2024-04-23 SDOH — ECONOMIC STABILITY: HOUSING INSECURITY
HOME SAFETY: PRIMARY REASON FOR HOME CARE: 66 YO FEMALE S/P FALL RESULTING IN L HIP FX,   S/P ORIF, NWB LLE.  PATIENT LIVES WITH HUSBAND IN A 2SH, HAS ADEQUATE CAREGIVER ASSISTANCE AS NEEDED.   SKILLED NEEDS: PATIENT WILL BENEFIT FROM FURTHER INSTRUCTION IN USE O

## 2024-04-23 ASSESSMENT — ENCOUNTER SYMPTOMS
HIGHEST PAIN SEVERITY IN PAST 24 HOURS: 2/10
PAIN: 1
PERSON REPORTING PAIN: PATIENT
PAIN SEVERITY GOAL: 0/10
PAIN LOCATION: LEFT HIP
PAIN LOCATION - PAIN FREQUENCY: INTERMITTENT
PAIN LOCATION - EXACERBATING FACTORS: WEIGHT BEARING
PAIN LOCATION - PAIN QUALITY: DULL
SUBJECTIVE PAIN PROGRESSION: RAPIDLY IMPROVING
LOWEST PAIN SEVERITY IN PAST 24 HOURS: 0/10
PAIN LOCATION - PAIN SEVERITY: 2/10

## 2024-04-23 ASSESSMENT — ACTIVITIES OF DAILY LIVING (ADL)
AMBULATION ASSISTANCE: SUPERVISION
AMBULATION ASSISTANCE: 1
TOILETING: SUPERVISION
DRESSING_LB_CURRENT_FUNCTION: MINIMUM ASSIST
TOILETING: 1
DRESSING_UB_CURRENT_FUNCTION: SUPERVISION
PHYSICAL TRANSFERS ASSESSED: 1
CURRENT_FUNCTION: CONTACT GUARD ASSIST

## 2024-04-23 NOTE — TELEPHONE ENCOUNTER
TRISTON Duffy MA (Today, 10:23 AM)  Spoke with patient and let her know she could remove the dressing she has on now, Friday. Then dry dressing PRN. Just let soap and water run down incision when showering, no lotions or creams.

## 2024-04-23 NOTE — TELEPHONE ENCOUNTER
Patient called states she had left hip ORIF on 4/20/2024, she was wondering about wound care instructions as they did not give her them when she left the hospital on Sunday.  She also wanted to make her post-op appointment, message sent to [employer] to schedule her.

## 2024-04-24 ENCOUNTER — HOME CARE VISIT (OUTPATIENT)
Dept: HOME HEALTH SERVICES | Facility: HOME HEALTH | Age: 69
End: 2024-04-24
Payer: MEDICARE

## 2024-04-24 PROCEDURE — 1090000002 HH PPS REVENUE DEBIT

## 2024-04-24 PROCEDURE — G0152 HHCP-SERV OF OT,EA 15 MIN: HCPCS | Mod: HHH

## 2024-04-24 PROCEDURE — 1090000001 HH PPS REVENUE CREDIT

## 2024-04-24 SDOH — HEALTH STABILITY: PHYSICAL HEALTH: EXERCISE TYPE: INSTRUCTED PT IN SUPINE, SEATED AND OPEN CHAIN LLE STANDING EX'S WITH WRITTEN INSTRUCTIONS PROVIDED.

## 2024-04-24 ASSESSMENT — BALANCE ASSESSMENTS
STANDING BALANCE: 1 - STEADY BUT WIDE STANCE AND USES CANE OR OTHER SUPPORT
IMMEDIATE STANDING BALANCE FIRST 5 SECONDS: 0 - UNSTEADY (STAGGERS, MOVES FEET, TRUNK SWAY)
ARISING SCORE: 0
ATTEMPTS TO ARISE: 0 - UNABLE WITHOUT HELP
TURNING 360 DEGREES STEPS: 0 - DISCONTINUOUS STEPS
EYES CLOSED AT MAXIMUM POSITION NUDGED: 0 - UNSTEADY
ARISES: 0 - UNABLE WITHOUT HELP
NUDGED SCORE: 0
BALANCE SCORE: 4
NUDGED: 0 - BEGINS TO FALL
SITTING DOWN: 1 - USES ARMS OR NOT SMOOTH MOTION
SITTING BALANCE: 1 - STEADY, SAFE

## 2024-04-24 ASSESSMENT — GAIT ASSESSMENTS
WALKING STANCE: 0 - HEELS APART
INITIATION OF GAIT IMMEDIATELY AFTER GO: 0 - ANY HESITANCY OR MULTIPLE ATTEMPTS TO START
STEP CONTINUITY: 0 - STOPPING OR DISCONTINUITY BETWEEN STEPS
TRUNK: 0 - MARKED SWAY OR USES WALKING AID
BALANCE AND GAIT SCORE: 6
GAIT SCORE: 2
PATH: 1 - MILD/MODERATE DEVIATION OR USES WALKING AID
STEP SYMMETRY: 0 - RIGHT AND LEFT STEP LENGTH NOT EQUAL
PATH SCORE: 1
TRUNK SCORE: 0

## 2024-04-24 ASSESSMENT — ACTIVITIES OF DAILY LIVING (ADL)
DRESSING_LB_CURRENT_FUNCTION: SUPERVISION
AMBULATION ASSISTANCE: SUPERVISION
CURRENT_FUNCTION: ONE PERSON
AMBULATION ASSISTANCE: ONE PERSON
TOILETING: SUPERVISION
AMBULATION_DISTANCE/DURATION_TOLERATED: 25'
AMBULATION ASSISTANCE: 1
TOILETING: 1

## 2024-04-24 ASSESSMENT — ENCOUNTER SYMPTOMS
LOWEST PAIN SEVERITY IN PAST 24 HOURS: 0/10
PERSON REPORTING PAIN: PATIENT
MUSCLE WEAKNESS: 1
SUBJECTIVE PAIN PROGRESSION: GRADUALLY IMPROVING
PAIN LOCATION - PAIN QUALITY: ACHING
PAIN LOCATION: BACK
PAIN LOCATION - PAIN SEVERITY: 1/10
HIGHEST PAIN SEVERITY IN PAST 24 HOURS: 1/10
PAIN SEVERITY GOAL: 0/10
PAIN: 1

## 2024-04-25 PROCEDURE — 1090000001 HH PPS REVENUE CREDIT

## 2024-04-25 PROCEDURE — 1090000002 HH PPS REVENUE DEBIT

## 2024-04-26 ENCOUNTER — HOME CARE VISIT (OUTPATIENT)
Dept: HOME HEALTH SERVICES | Facility: HOME HEALTH | Age: 69
End: 2024-04-26
Payer: MEDICARE

## 2024-04-26 PROCEDURE — 1090000002 HH PPS REVENUE DEBIT

## 2024-04-26 PROCEDURE — G0151 HHCP-SERV OF PT,EA 15 MIN: HCPCS | Mod: HHH

## 2024-04-26 PROCEDURE — 1090000001 HH PPS REVENUE CREDIT

## 2024-04-27 PROCEDURE — 1090000001 HH PPS REVENUE CREDIT

## 2024-04-27 PROCEDURE — 1090000002 HH PPS REVENUE DEBIT

## 2024-04-27 ASSESSMENT — ENCOUNTER SYMPTOMS
PERSON REPORTING PAIN: PATIENT
DENIES PAIN: 1

## 2024-04-28 PROCEDURE — 1090000001 HH PPS REVENUE CREDIT

## 2024-04-28 PROCEDURE — 1090000002 HH PPS REVENUE DEBIT

## 2024-04-29 ENCOUNTER — HOME CARE VISIT (OUTPATIENT)
Dept: HOME HEALTH SERVICES | Facility: HOME HEALTH | Age: 69
End: 2024-04-29
Payer: MEDICARE

## 2024-04-29 VITALS
OXYGEN SATURATION: 99 % | SYSTOLIC BLOOD PRESSURE: 138 MMHG | DIASTOLIC BLOOD PRESSURE: 64 MMHG | TEMPERATURE: 98.1 F | RESPIRATION RATE: 18 BRPM | HEART RATE: 82 BPM

## 2024-04-29 PROCEDURE — G0152 HHCP-SERV OF OT,EA 15 MIN: HCPCS | Mod: HHH

## 2024-04-29 PROCEDURE — 1090000002 HH PPS REVENUE DEBIT

## 2024-04-29 PROCEDURE — 1090000001 HH PPS REVENUE CREDIT

## 2024-04-29 ASSESSMENT — ENCOUNTER SYMPTOMS
PERSON REPORTING PAIN: PATIENT
DENIES PAIN: 1

## 2024-04-29 ASSESSMENT — ACTIVITIES OF DAILY LIVING (ADL)
AMBULATION ASSISTANCE: 1
AMBULATION ASSISTANCE: INDEPENDENT

## 2024-04-30 ENCOUNTER — HOME CARE VISIT (OUTPATIENT)
Dept: HOME HEALTH SERVICES | Facility: HOME HEALTH | Age: 69
End: 2024-04-30
Payer: MEDICARE

## 2024-04-30 PROCEDURE — 1090000002 HH PPS REVENUE DEBIT

## 2024-04-30 PROCEDURE — 1090000001 HH PPS REVENUE CREDIT

## 2024-04-30 PROCEDURE — G0151 HHCP-SERV OF PT,EA 15 MIN: HCPCS | Mod: HHH

## 2024-04-30 ASSESSMENT — ENCOUNTER SYMPTOMS
PERSON REPORTING PAIN: PATIENT
DENIES PAIN: 1

## 2024-05-01 ENCOUNTER — HOME CARE VISIT (OUTPATIENT)
Dept: HOME HEALTH SERVICES | Facility: HOME HEALTH | Age: 69
End: 2024-05-01
Payer: MEDICARE

## 2024-05-01 PROCEDURE — 1090000001 HH PPS REVENUE CREDIT

## 2024-05-01 PROCEDURE — 1090000002 HH PPS REVENUE DEBIT

## 2024-05-02 ENCOUNTER — HOME CARE VISIT (OUTPATIENT)
Dept: HOME HEALTH SERVICES | Facility: HOME HEALTH | Age: 69
End: 2024-05-02
Payer: MEDICARE

## 2024-05-02 PROCEDURE — G0151 HHCP-SERV OF PT,EA 15 MIN: HCPCS | Mod: HHH

## 2024-05-02 PROCEDURE — 1090000001 HH PPS REVENUE CREDIT

## 2024-05-02 PROCEDURE — 1090000002 HH PPS REVENUE DEBIT

## 2024-05-03 ENCOUNTER — OFFICE VISIT (OUTPATIENT)
Dept: ORTHOPEDIC SURGERY | Facility: CLINIC | Age: 69
End: 2024-05-03
Payer: MEDICARE

## 2024-05-03 ENCOUNTER — HOSPITAL ENCOUNTER (OUTPATIENT)
Dept: RADIOLOGY | Facility: CLINIC | Age: 69
Discharge: HOME | End: 2024-05-03
Payer: MEDICARE

## 2024-05-03 DIAGNOSIS — M80.059D HIP FRACTURE DUE TO OSTEOPOROSIS WITH ROUTINE HEALING: ICD-10-CM

## 2024-05-03 DIAGNOSIS — S72.012A CLOSED SUBCAPITAL FRACTURE OF LEFT FEMUR, INITIAL ENCOUNTER (MULTI): ICD-10-CM

## 2024-05-03 PROCEDURE — 1036F TOBACCO NON-USER: CPT | Performed by: ORTHOPAEDIC SURGERY

## 2024-05-03 PROCEDURE — 99024 POSTOP FOLLOW-UP VISIT: CPT | Performed by: ORTHOPAEDIC SURGERY

## 2024-05-03 PROCEDURE — 73502 X-RAY EXAM HIP UNI 2-3 VIEWS: CPT | Mod: LEFT SIDE | Performed by: ORTHOPAEDIC SURGERY

## 2024-05-03 PROCEDURE — 1111F DSCHRG MED/CURRENT MED MERGE: CPT | Performed by: ORTHOPAEDIC SURGERY

## 2024-05-03 PROCEDURE — 1090000002 HH PPS REVENUE DEBIT

## 2024-05-03 PROCEDURE — 1090000001 HH PPS REVENUE CREDIT

## 2024-05-03 PROCEDURE — 73502 X-RAY EXAM HIP UNI 2-3 VIEWS: CPT | Mod: LT

## 2024-05-03 PROCEDURE — 1159F MED LIST DOCD IN RCRD: CPT | Performed by: ORTHOPAEDIC SURGERY

## 2024-05-03 NOTE — PROGRESS NOTES
Chief complaint: 2 weeks ORIF left fracture        HPI: Patient feels good.  Not having much pain.  She has been toe-touch weightbearing.  She is using a walker crutches.  No numbness tingling fevers chills.    Physical exam: Incision is perfectly well-healed.  Staples are in place.  She has appropriate hip range of motion.  She has good sensation distally.    X-rays show good positioning of fracture and hardware.    Patient doing well 2 weeks out left femoral neck fracture with a [employer] femoral neck system.  We will let her be 50% with a paring.  We will see her back in 3 weeks for AP lateral x-rays of the left hip.  If things look good at that point she will be weightbearing as tolerated.  Staples will be removed today.  She will not engage in baths or water physical therapy for 1 week and if the wound is completely healed at that point.

## 2024-05-04 PROCEDURE — 1090000001 HH PPS REVENUE CREDIT

## 2024-05-04 PROCEDURE — 1090000002 HH PPS REVENUE DEBIT

## 2024-05-04 ASSESSMENT — ENCOUNTER SYMPTOMS
DENIES PAIN: 1
PERSON REPORTING PAIN: PATIENT

## 2024-05-05 PROCEDURE — 1090000001 HH PPS REVENUE CREDIT

## 2024-05-05 PROCEDURE — 1090000002 HH PPS REVENUE DEBIT

## 2024-05-06 ENCOUNTER — HOME CARE VISIT (OUTPATIENT)
Dept: HOME HEALTH SERVICES | Facility: HOME HEALTH | Age: 69
End: 2024-05-06
Payer: MEDICARE

## 2024-05-06 PROCEDURE — 1090000002 HH PPS REVENUE DEBIT

## 2024-05-06 PROCEDURE — G0152 HHCP-SERV OF OT,EA 15 MIN: HCPCS | Mod: HHH

## 2024-05-06 PROCEDURE — 1090000001 HH PPS REVENUE CREDIT

## 2024-05-06 SDOH — ECONOMIC STABILITY: HOUSING INSECURITY: HOME SAFETY: PATIENT UPGRADED TO 50% WB LLE, PLANS TO BEGIN AQUATIC OUTPATIENT REHAB IN 1 WEEK

## 2024-05-06 ASSESSMENT — ENCOUNTER SYMPTOMS
PAIN LOCATION - PAIN QUALITY: ACHING
PAIN LOCATION: LEFT HIP
LOWEST PAIN SEVERITY IN PAST 24 HOURS: 0/10
HIGHEST PAIN SEVERITY IN PAST 24 HOURS: 1/10
PAIN SEVERITY GOAL: 0/10
SUBJECTIVE PAIN PROGRESSION: RAPIDLY IMPROVING
PAIN LOCATION - PAIN SEVERITY: 0/10
PAIN: 1
PAIN LOCATION - RELIEVING FACTORS: TYLENOL
PERSON REPORTING PAIN: PATIENT

## 2024-05-07 ENCOUNTER — HOME CARE VISIT (OUTPATIENT)
Dept: HOME HEALTH SERVICES | Facility: HOME HEALTH | Age: 69
End: 2024-05-07
Payer: MEDICARE

## 2024-05-07 PROCEDURE — 1090000002 HH PPS REVENUE DEBIT

## 2024-05-07 PROCEDURE — 1090000001 HH PPS REVENUE CREDIT

## 2024-05-07 PROCEDURE — G0151 HHCP-SERV OF PT,EA 15 MIN: HCPCS | Mod: HHH

## 2024-05-08 ENCOUNTER — TELEPHONE (OUTPATIENT)
Dept: ORTHOPEDIC SURGERY | Facility: CLINIC | Age: 69
End: 2024-05-08
Payer: MEDICARE

## 2024-05-08 PROCEDURE — 1090000002 HH PPS REVENUE DEBIT

## 2024-05-08 PROCEDURE — 1090000001 HH PPS REVENUE CREDIT

## 2024-05-09 PROCEDURE — 1090000002 HH PPS REVENUE DEBIT

## 2024-05-09 PROCEDURE — 1090000001 HH PPS REVENUE CREDIT

## 2024-05-10 ENCOUNTER — HOME CARE VISIT (OUTPATIENT)
Dept: HOME HEALTH SERVICES | Facility: HOME HEALTH | Age: 69
End: 2024-05-10
Payer: MEDICARE

## 2024-05-10 PROCEDURE — 1090000001 HH PPS REVENUE CREDIT

## 2024-05-10 PROCEDURE — G0151 HHCP-SERV OF PT,EA 15 MIN: HCPCS | Mod: HHH

## 2024-05-10 PROCEDURE — 1090000002 HH PPS REVENUE DEBIT

## 2024-05-11 PROCEDURE — 1090000002 HH PPS REVENUE DEBIT

## 2024-05-11 PROCEDURE — 1090000001 HH PPS REVENUE CREDIT

## 2024-05-12 PROCEDURE — 1090000001 HH PPS REVENUE CREDIT

## 2024-05-12 PROCEDURE — 1090000002 HH PPS REVENUE DEBIT

## 2024-05-13 PROCEDURE — 1090000002 HH PPS REVENUE DEBIT

## 2024-05-13 PROCEDURE — 1090000001 HH PPS REVENUE CREDIT

## 2024-05-14 PROCEDURE — G0180 MD CERTIFICATION HHA PATIENT: HCPCS | Performed by: ORTHOPAEDIC SURGERY

## 2024-05-14 SDOH — ECONOMIC STABILITY: HOUSING INSECURITY

## 2024-05-14 SDOH — HEALTH STABILITY: PHYSICAL HEALTH: EXERCISE TYPE: SUPINE, SEATED AND STANDING LE STRENGTHENING EX'S

## 2024-05-14 ASSESSMENT — BALANCE ASSESSMENTS
EYES CLOSED AT MAXIMUM POSITION NUDGED: 0 - UNSTEADY
SITTING DOWN: 1 - USES ARMS OR NOT SMOOTH MOTION
NUDGED: 1 - STAGGERS, GRABS, CATCHES SELF
NUDGED SCORE: 1
ARISES: 1 - ABLE, USES ARMS TO HELP
SITTING BALANCE: 1 - STEADY, SAFE
TURNING 360 DEGREES STEPS: 1 - CONTINUOUS STEPS
ATTEMPTS TO ARISE: 2 - ABLE TO RISE, ONE ATTEMPT
ARISING SCORE: 1
IMMEDIATE STANDING BALANCE FIRST 5 SECONDS: 1 - STEADY BUT USES WALKER OR OTHER SUPPORT
STANDING BALANCE: 1 - STEADY BUT WIDE STANCE AND USES CANE OR OTHER SUPPORT
BALANCE SCORE: 10

## 2024-05-14 ASSESSMENT — GAIT ASSESSMENTS
INITIATION OF GAIT IMMEDIATELY AFTER GO: 1 - NO HESITANCY
PATH: 1 - MILD/MODERATE DEVIATION OR USES WALKING AID
WALKING STANCE: 1 - HEELS ALMOST TOUCHING WHILE WALKING
TRUNK: 0 - MARKED SWAY OR USES WALKING AID
GAIT SCORE: 8
TRUNK SCORE: 0
BALANCE AND GAIT SCORE: 18
STEP SYMMETRY: 0 - RIGHT AND LEFT STEP LENGTH NOT EQUAL
PATH SCORE: 1
STEP CONTINUITY: 1 - STEPS APPEAR CONTINUOUS

## 2024-05-14 ASSESSMENT — ACTIVITIES OF DAILY LIVING (ADL)
HOME_HEALTH_OASIS: 00
AMBULATION ASSISTANCE: INDEPENDENT
AMBULATION ASSISTANCE: 1
AMBULATION_DISTANCE/DURATION_TOLERATED: 150'
OASIS_M1830: 01
PHYSICAL_TRANSFERS_DEVICES: FWW
PHYSICAL TRANSFERS ASSESSED: 1
CURRENT_FUNCTION: INDEPENDENT

## 2024-05-14 ASSESSMENT — ENCOUNTER SYMPTOMS
PERSON REPORTING PAIN: PATIENT
DENIES PAIN: 1
PERSON REPORTING PAIN: PATIENT
DENIES PAIN: 1
MUSCLE WEAKNESS: 1

## 2024-05-15 ENCOUNTER — EVALUATION (OUTPATIENT)
Dept: PHYSICAL THERAPY | Facility: CLINIC | Age: 69
End: 2024-05-15
Payer: MEDICARE

## 2024-05-15 DIAGNOSIS — S72.012A CLOSED SUBCAPITAL FRACTURE OF LEFT FEMUR, INITIAL ENCOUNTER (MULTI): ICD-10-CM

## 2024-05-15 DIAGNOSIS — M80.059D HIP FRACTURE DUE TO OSTEOPOROSIS WITH ROUTINE HEALING: ICD-10-CM

## 2024-05-15 PROCEDURE — 97110 THERAPEUTIC EXERCISES: CPT | Mod: GP

## 2024-05-15 PROCEDURE — 97161 PT EVAL LOW COMPLEX 20 MIN: CPT | Mod: GP

## 2024-05-15 ASSESSMENT — ENCOUNTER SYMPTOMS
OCCASIONAL FEELINGS OF UNSTEADINESS: 0
DEPRESSION: 0
LOSS OF SENSATION IN FEET: 0

## 2024-05-15 NOTE — PROGRESS NOTES
Physical Therapy    Physical Therapy Evaluation and Treatment      Patient Name: Starla Carlos  MRN: [id number]  Today's Date: 5/15/2024  Time Calculation  Start Time: 0948  Stop Time: 1025  Time Calculation (min): 37 min    Visit #1  0% coins, $240 ded (met), no copay, bmn mekhi yr, no PA. // Lorena confirmed 5/13/24 10:38am Medicare A/B //[employer] $0 spent towards therapy cap as of 5/8/24. KX mod required after ~20v     Assessment:    Pt presents s/p L hip ORIF 4/20/24 after sustaining a L hip fracture from a fall at home. Pt demonstrates impairments of L hip and knee weakness resulting in limited mobility, transfers and ADL performance.   Patient would benefit from PT in the aquatic setting to address current impairments and deficits. The aquatic therapy will greatly benefit the patient and facilitate further progress by utilizing the water buoyancy for reduction of weightbearing and compressive forces/strain to patients back/hip and allow for less restricted and painless movement/mobility. Additionally the water can be utilized to improve the patients postural control, lower extremity strengthening and activity tolerance in a safe environment that will not cause an increase in the patients pain. For safety I recommend that the patient be accompanied with someone to the pool. Upon completion and d/c from PT patient encouraged to continue pool therapy individually at local pool to maintain progress.      Plan:  OP PT Plan  Treatment/Interventions: Aquatic therapy, Education/ Instruction, Gait training, Manual therapy, Neuromuscular re-education, Self care/ home management, Taping techniques, Therapeutic activities, Therapeutic exercises, Ultrasound, Vasopneumatic device  PT Plan: Skilled PT  PT Frequency: 1 time per week (1-2x per week)  Certification Period Start Date: 05/15/24  Certification Period End Date: 08/13/24  Number of Treatments Authorized: bmn  Rehab Potential: Good  Plan of Care Agreement: Patient    Current  Problem:   1. Hip fracture due to osteoporosis with routine healing  Referral to Physical Therapy      2. Closed subcapital fracture of left femur, initial encounter (Multi)  Referral to Physical Therapy          Subjective    Pt presents s/p L hip ORIF 24 after sustaining a L hip fracture from a fall at home. Patient has completed 2 weeks of home PT and has been referred to outpatient PT pool services. Patient currently ambulating with wheeled walker and denies any falls since surgery along with no bowel/bladder dysfunction currently. Pt does endorse leg fatigue with walking and ADL performance along with continued pain at the incision site.      Precautions:   50% WB on L LE  Fall risk, recommend close guard for pool deck ambulation and ramp descent into pool.       Pain:  3/10 L lateral hip     Home Liv[place] story home with spouse. 12 steps to upstairs (stays on first floor). 2 steps to enter home.         Objective   Hip Musculoskeletal Exam  Gait    Antalgic: left    Limp: left    Trendelenburg: left    Assistive device: walker    Inspection    Left      Incision: clean, dry and intact    Strength    Right      Extension: 4-/5.       Flexion: 4-/5.       Internal rotation: 4-/5.       External rotation: 4-/5.       Adduction: 4-/5.       Abduction: 4-/5.     Left      Extension: 3/5.       Flexion: 3/5. Flexion is affected by pain.       Internal rotation: 3/5. Internal rotation is affected by pain.       External rotation: 3/5. External rotation is affected by pain.       Adduction: 3/5.       Abduction: 3/5.     Knee Musculoskeletal Exam  Gait    Antalgic: left    Limp: left    Trendelenburg: left    Assistive device: walker    Strength    Right      Extension: 4-/5.       Flexion: 4-/5.     Left      Extension: 3/5.       Flexion: 4-/5.        Outcome Measures:  Other Measures  Lower Extremity Funtional Score (LEFS): 22/80     Treatments:  STS x10  Seated clams with RTB x20  Seated LAQ with RTB  x20  Ambulation on track 1/10 mile with walker      EDUCATION:  Outpatient Education  Individual(s) Educated: Patient  Education Provided: Anatomy, Body Mechanics, Fall Risk, Home Exercise Program, Home Safety, Physiology, POC, Post-Op Precautions, Posture    Goals:  -Pool Goals-  Patient will be able to perform 45 minutes of aquatic exercise with reported </=  1/10 pain level    Patient will  ambulate up/ down pool ramp without UE assist or decrease UE assist .    Patient will display improved gait quality on pool deck demonstrating improved joni /step through pattern    Patient will ascend/ descend 8 inch step in 3 1/2 feet water with good control without UE assist     Patient will demonstrate dynamic lumbar stabilization (DLS) with good control, performing with UE involvement and paddle resistance at level 5. (level 5 is the hardest/ level 1 is the easiest)    Patient will perform 5x sit to stand from bench without the use of upper extremities </= 15 seconds to show improved lower extremity functional strength.    Patient will perform single leg stance (SLS) in 4 foot water depth without the use of upper extremity assist.    Patient will be able to enter/exit pool via pool ramp and will not require assistance of chair lift to participate with aquatic session.    Patient will perform/recall >/= 85% of aquatic program without cueing.      -Land goals-  Patient will improve L hip strength to >/=4+/5 in all planes for improved proximal hip stability with ambulation, standing and stair negotiation.    Patient will improve L hip flexion AROM to >/=0-110 deg for improved hip mobility.    Patient will improve L hip extension to >=0-20 deg for improved hip mobility and gait mechanics.    Patient will improve LEFS score to >/=65/80    Patient will be independent with HEP.     Patient will demonstrate reciprocal gait pattern without device

## 2024-05-23 ENCOUNTER — TREATMENT (OUTPATIENT)
Dept: PHYSICAL THERAPY | Facility: CLINIC | Age: 69
End: 2024-05-23
Payer: MEDICARE

## 2024-05-23 DIAGNOSIS — S72.012A CLOSED SUBCAPITAL FRACTURE OF LEFT FEMUR, INITIAL ENCOUNTER (MULTI): ICD-10-CM

## 2024-05-23 DIAGNOSIS — M80.059D HIP FRACTURE DUE TO OSTEOPOROSIS WITH ROUTINE HEALING: ICD-10-CM

## 2024-05-23 PROCEDURE — 97113 AQUATIC THERAPY/EXERCISES: CPT | Mod: CQ,GP

## 2024-05-23 ASSESSMENT — PAIN - FUNCTIONAL ASSESSMENT: PAIN_FUNCTIONAL_ASSESSMENT: 0-10

## 2024-05-23 ASSESSMENT — PAIN SCALES - GENERAL: PAINLEVEL_OUTOF10: 4

## 2024-05-23 ASSESSMENT — PAIN DESCRIPTION - DESCRIPTORS: DESCRIPTORS: ACHING

## 2024-05-23 NOTE — PROGRESS NOTES
Physical Therapy Treatment    Patient Name: Starla Carlos  MRN: [id number]  Today's Date: 5/23/2024  Time Calculation  Start Time: 1315  Stop Time: 1340  Time Calculation (min): 25 min    Insurance  Visit #2  0% coins, $240 ded (met), no copay, bmn mekhi yr, no PA. // Lorena confirmed 5/13/24 10:38am Medicare A/B //[employer] $0 spent towards therapy cap as of 5/8/24. KX mod required after     Current Problem  1. Hip fracture due to osteoporosis with routine healing  Follow Up In Physical Therapy      2. Closed subcapital fracture of left femur, initial encounter (Multi)  Follow Up In Physical Therapy        Subjective    General   Pt arrives 15 minutes late for scheduled appt time & states was due to traffic coming into facility.  Was able to see pt this session secondary to having an opening in schedule.  She is here to begin aquatic POC today.  She reports she is only 50% weightbearing at this time, but is hoping to be able to do more after follow up with MD next week.     Precautions  Precautions  Precautions Comment: 50% WB (per pt)  WB protocol confirmed through MD documentation on 5/3/24: 50% with paring for 3 weeks until MD follow up.  MD follow up on 5/28/24.    Pain  Pain Assessment  Pain Assessment: 0-10  Pain Score: 4  Pain Location: Hip  Pain Orientation: Left  Pain Radiating Towards: across low back  Pain Descriptors: Aching  Pain Frequency: Constant/continuous    Objective   Pt ambulates with wheeled walker.  States she is restricted to 50% WB at this time & demonstrates good ability to comply with WB restrictions.    Treatments:  Aquatic Therapy (79332): 25 Minutes, 2 Units    Activities:  Aquatic Gait Activities: Fwd/Lat 2 laps  HR/TR: x15  Hip PRE's (L only for strength): x10 each  Hip Circles: --> Add NV  Mini Squat: x10  HS Curl (L only) x10  LAQ: (L only) x10  Row: --> Add NV    Assessment:   Initiated aquatic activities this visit. The aquatic activities will greatly benefit the patient and facilitate  further progress by utilizing the water buoyancy for reduction of weightbearing and compressive forces/strain to patients back/hip and allow for less restricted and painless movement/mobility & strengthening.  She exhibits good ability to follow both verbal/visual cues provided for activities & completes with good tolerance.  Anticipate she will do well with POC.  Will plan to continue per protocol & progress a tolerated.     Plan:   Assess response to initial aquatic visit.  Assess follow up with MD prior to next aquatic visit.  Continue with POC & progress with activities as tolerated, per protocol.

## 2024-05-28 ENCOUNTER — OFFICE VISIT (OUTPATIENT)
Dept: ORTHOPEDIC SURGERY | Facility: CLINIC | Age: 69
End: 2024-05-28
Payer: MEDICARE

## 2024-05-28 ENCOUNTER — HOSPITAL ENCOUNTER (OUTPATIENT)
Dept: RADIOLOGY | Facility: CLINIC | Age: 69
Discharge: HOME | End: 2024-05-28
Payer: MEDICARE

## 2024-05-28 DIAGNOSIS — Z96.642 STATUS POST LEFT HIP REPLACEMENT: ICD-10-CM

## 2024-05-28 DIAGNOSIS — S72.002S HIP FRACTURE REQUIRING OPERATIVE REPAIR, LEFT, SEQUELA: Primary | ICD-10-CM

## 2024-05-28 PROCEDURE — 1159F MED LIST DOCD IN RCRD: CPT | Performed by: ORTHOPAEDIC SURGERY

## 2024-05-28 PROCEDURE — 73502 X-RAY EXAM HIP UNI 2-3 VIEWS: CPT | Mod: LT

## 2024-05-28 PROCEDURE — 99024 POSTOP FOLLOW-UP VISIT: CPT | Performed by: ORTHOPAEDIC SURGERY

## 2024-05-28 PROCEDURE — 73502 X-RAY EXAM HIP UNI 2-3 VIEWS: CPT | Mod: LEFT SIDE | Performed by: ORTHOPAEDIC SURGERY

## 2024-05-28 NOTE — PROGRESS NOTES
Established patient follow-up 5 weeks from femoral nail.  For hip fracture left doing quite well not much in the way of pain little bit of left-sided achiness from the knee up on the left side.  But no bowel or bladder complaints no saddle anesthesia no gait or balance changes.  She is using a walker and some crutches for assistance.    Physical exam: Well-nourished, well kept.  No lymphangitis or lymphadenopathy in the examined extremities.  Good perfusion to the lower extremities bilaterally.  Dorsalis pedis pulses 2+.  Capillary refill to the digits brisk.  No distal edema.  Patient walks with crutches at this point and a walker at home there is decreased range of motion flexion-extension rotation lumbar spine secondary to her hip fracture postoperative recovery restrictions super good range of motion to the left hip.  Full wrote movement to the knee no pain stable good strength no instability.  Comparison examination of the contralateral side is normal with regards to palpation, range of motion, strength and stability.  All wounds are clean dry and intact no signs of infection gross sensation intact to the lower extremity is bilaterally.  Affect normal.    X-rays today show good placement of femoral nail fixation.  Fracture appears to be reduced and in good alignment.  No bony abnormalities.    Assessment: Patient 5 weeks out femoral nail.  She is going to continue to can to take it easy and ambulate a little bit more.  Weaning herself off the crutches and walkers as she feels comfortable.    Plan: We will follow-up in 8 weeks will need an AP lateral x-ray on that visit

## 2024-05-29 ENCOUNTER — TREATMENT (OUTPATIENT)
Dept: PHYSICAL THERAPY | Facility: CLINIC | Age: 69
End: 2024-05-29
Payer: MEDICARE

## 2024-05-29 DIAGNOSIS — M80.059D HIP FRACTURE DUE TO OSTEOPOROSIS WITH ROUTINE HEALING: ICD-10-CM

## 2024-05-29 DIAGNOSIS — S72.012A CLOSED SUBCAPITAL FRACTURE OF LEFT FEMUR, INITIAL ENCOUNTER (MULTI): ICD-10-CM

## 2024-05-29 PROCEDURE — 97113 AQUATIC THERAPY/EXERCISES: CPT | Mod: GP,CQ

## 2024-05-29 NOTE — PROGRESS NOTES
Patient Name: Starla Carlos  MRN: [id number]  Today's Date: 5/29/2024  Time Calculation  Start Time: 1630  Stop Time: 1656  Time Calculation (min): 26 min    Insurance  Visit #3  0% coins, $240 ded (met), no copay, bmn mekhi yr, no PA. // Lorena confirmed 5/13/24 10:38am Medicare A/B //AARP $0 spent towards therapy cap as of 5/8/24. KX mod required after     Subjective:  Reports that she had follow up with surgeon who progresses WB status to WBAT .    Objective:  Presents without assistive device . Antalgic gait with R LE. Educated in use of SL crutch for longer distance until increased stability is gained.   Email [facility] to Access Code [email or web address] [place] program    Assessment:   Pt requires cues for exercise recall. Pt pleased on her ability to complete loading exercises in 4 feet water without increased pain.     Plan:   Cont to progress as able .    Treatment :   Aquatic Gait Activities: Fwd/Lat/bwd 2 laps each   HR/TR: x15  Hip PRE's x10   Hip Circles: x10   Mini Squat: x10  HS Curl x10  LAQ:  x10  March 2 minutes at ladder             Current Problem:  1. Hip fracture due to osteoporosis with routine healing  Follow Up In Physical Therapy      2. Closed subcapital fracture of left femur, initial encounter (Multi)  Follow Up In Physical Therapy              Pain:  2/10   Location:   L hip         Precautions: No recent falls

## 2024-06-03 ENCOUNTER — TREATMENT (OUTPATIENT)
Dept: PHYSICAL THERAPY | Facility: CLINIC | Age: 69
End: 2024-06-03
Payer: MEDICARE

## 2024-06-03 DIAGNOSIS — M80.059D HIP FRACTURE DUE TO OSTEOPOROSIS WITH ROUTINE HEALING: ICD-10-CM

## 2024-06-03 DIAGNOSIS — S72.012A CLOSED SUBCAPITAL FRACTURE OF LEFT FEMUR, INITIAL ENCOUNTER (MULTI): ICD-10-CM

## 2024-06-03 PROCEDURE — 97113 AQUATIC THERAPY/EXERCISES: CPT | Mod: GP,CQ

## 2024-06-03 NOTE — PROGRESS NOTES
Patient Name: Starla Carlos  MRN: [id number]  Today's Date: 6/3/2024  Time Calculation  Start Time: 1345  Stop Time: 1410  Time Calculation (min): 25 min  Insurance  Visit #4  0% coins, $240 ded (met), no copay, bmn mekhi yr, no PA. // Lorena confirmed 5/13/24 10:38am Medicare A/B //[employer]ARP $0 spent towards therapy cap as of 5/8/24. KX mod required after     Subjective:  Pt arrived 15 minutes late for 30 minute session.  States that she occasionally gets R foot numbness from toes to ball of foot per pt . States that she has noticed that occurs after increased activity and symptoms don't last resolve with rest . Increased stair climbing activity prior to session so increased LB/hip soreness.     Objective:  Presents with no assistive device . Discussed using 1 crutch on R to improve gait quality.    Assessment:   Pt reported increased LB tightness approximately 10 minutes post exercises. Added glute/ sktc stretch for unloading and LB stretching which helped per pt.    Plan:   Progress as able    Treatment :   One on one for 1 billable unit 145-200   200-215 independent     Aquatic Gait Activities: Fwd/Lat/bwd 3laps each  independent today.  HR/TR: x15  Hip PRE's 2x10   Hip Circles: x10   Mini Squat: x20  HS Curl x20  LAQ:  x20  March 2 minutes at ladder   SKTC x30         Current Problem:  1. Hip fracture due to osteoporosis with routine healing  Follow Up In Physical Therapy      2. Closed subcapital fracture of left femur, initial encounter (Multi)  Follow Up In Physical Therapy              Pain:  4/10   Location: R hip /LB       Precautions: No recent falls

## 2024-06-10 ENCOUNTER — APPOINTMENT (OUTPATIENT)
Dept: PHYSICAL THERAPY | Facility: CLINIC | Age: 69
End: 2024-06-10
Payer: MEDICARE

## 2024-06-11 ENCOUNTER — TREATMENT (OUTPATIENT)
Dept: PHYSICAL THERAPY | Facility: CLINIC | Age: 69
End: 2024-06-11
Payer: MEDICARE

## 2024-06-11 DIAGNOSIS — S72.012A CLOSED SUBCAPITAL FRACTURE OF LEFT FEMUR, INITIAL ENCOUNTER (MULTI): ICD-10-CM

## 2024-06-11 DIAGNOSIS — M80.059D HIP FRACTURE DUE TO OSTEOPOROSIS WITH ROUTINE HEALING: ICD-10-CM

## 2024-06-11 PROCEDURE — 97113 AQUATIC THERAPY/EXERCISES: CPT | Mod: CQ,GP

## 2024-06-11 ASSESSMENT — PAIN SCALES - GENERAL: PAINLEVEL_OUTOF10: 2

## 2024-06-11 ASSESSMENT — PAIN - FUNCTIONAL ASSESSMENT: PAIN_FUNCTIONAL_ASSESSMENT: 0-10

## 2024-06-11 ASSESSMENT — PAIN DESCRIPTION - DESCRIPTORS: DESCRIPTORS: TIGHTNESS

## 2024-06-11 NOTE — PROGRESS NOTES
">Physical Therapy Treatment    Patient Name: Starla Carlos  MRN: [id number]  Today's Date: 6/11/2024  Time Calculation  Start Time: 1801  Stop Time: 1831  Time Calculation (min): 30 min    Insurance  Visit #5  0% coins, $240 ded (met), no copay, bmn mekhi yr, no PA. // Lorena confirmed 5/13/24 10:38am Medicare A/B //[employer]ARP $0 spent towards therapy cap as of 5/8/24. KX mod required after      Current Problem  1. Hip fracture due to osteoporosis with routine healing  Follow Up In Physical Therapy      2. Closed subcapital fracture of left femur, initial encounter (Multi)  Follow Up In Physical Therapy          Subjective    General  Pt arrives & reports \"I messed my knee up\".  States there was a spot on her carpet a couple days ago that needed to be cleaned, so she got on her hands/knees to clean it & then was unable to get up.  Reports having to crawl to stairs & use banister to help herself up.  Reports L knee has been more stiff & swollen since.  Then states today she tried balancing on L LE to pull on pant leg while holding onto a drawer.  States the drawer broke & she fell to her knee.  She iced after landing on it to reduce inflammation.  Reports tomorrow she will be getting on a plane to fly to [place] & states she is not sure she can make it through the airport yet.  She has reserved a wheelchair to transport her from check in to the gate.  She reports her biggest challenge is still in the mornings.  \"I can barely get out of bed, I'm so stiff.\"      Precautions  Precautions  Precautions Comment: Pt reports mild LOB/fall today.  See subjective for info    Pain  Pain Assessment  Pain Assessment: 0-10  Pain Score: 2  Pain Location: Hip  Pain Orientation: Left  Pain Descriptors: Tightness  Pain Frequency: Intermittent    Objective   Pt exhibits antalgic gait on pool deck without assistive device.  Step to gait pattern, decreased heel strike/toe off on L LE.    Treatments:  Aquatic Therapy (73082): 30 Minutes, 2 " Units    Activities:  Aquatic Gait Activities: Fwd/Lat/bwd 3laps each  independent today.  HR/TR: x15  Hip PRE's 2x10 LEXI  Hip Circles: x10   Mini Squat: x20  HS Curl x20  LAQ:  x20  March 2 minutes at ladder   SKTC x30      Assessment:   No changes were made to current POC this visit secondary to increase in sx's reported in L hip/knee at beginning of session. (See subjective info)  She exhibits some difficulty with activities, but was able to complete.  Good follow through to any vc's provided for improving form/technique.  Mild increase in overall discomfort post activity (to 3-4/10).  Will plan to continue with aquatic POC & progress as tolerated next visit.    Plan:   Assess sx's at next visit & continue with POC, progress as tolerated to increase LE strength, stability.

## 2024-06-17 ENCOUNTER — APPOINTMENT (OUTPATIENT)
Dept: PHYSICAL THERAPY | Facility: CLINIC | Age: 69
End: 2024-06-17
Payer: MEDICARE

## 2024-06-19 ENCOUNTER — APPOINTMENT (OUTPATIENT)
Dept: PHYSICAL THERAPY | Facility: CLINIC | Age: 69
End: 2024-06-19
Payer: MEDICARE

## 2024-06-19 ENCOUNTER — TREATMENT (OUTPATIENT)
Dept: PHYSICAL THERAPY | Facility: CLINIC | Age: 69
End: 2024-06-19
Payer: MEDICARE

## 2024-06-19 DIAGNOSIS — M80.059D HIP FRACTURE DUE TO OSTEOPOROSIS WITH ROUTINE HEALING: ICD-10-CM

## 2024-06-19 DIAGNOSIS — S72.012A CLOSED SUBCAPITAL FRACTURE OF LEFT FEMUR, INITIAL ENCOUNTER (MULTI): ICD-10-CM

## 2024-06-19 PROCEDURE — 97113 AQUATIC THERAPY/EXERCISES: CPT | Mod: GP,CQ

## 2024-06-19 NOTE — PROGRESS NOTES
Patient Name: Starla Carlos  MRN: [id number]  Today's Date: 6/19/2024  Time Calculation  Start Time: 1125  Stop Time: 1155  Time Calculation (min): 30 min  Insurance  Visit #6  0% coins, $240 ded (met), no copay, bmn mekhi yr, no PA. // Lorena confirmed 5/13/24 10:38am Medicare A/B //[employer]ARP $0 spent towards therapy cap as of 5/8/24. KX mod required after   Subjective:  Reports that L knee is feeling better since reported incidence . States that she is please do her progress and was able to recently fly to [place] which required increased activity no increased hip pain or difficulty.  Some back pain currently denies any hip/knee pain .     Objective:  Gait increased stride length with decreased antalgia    Assessment:   Pt is making gains with improved loading of L LE . Limited  L hip extension still noted with L knee flexion on stance phase .  Pt demos familiarity with exercises however requires cues for proper technique and avoidance of compensatory movements.     Plan:   Discuss POC in relation to land aquatic sessions   Treatment :   Aquatic Gait Activities: Fwd/Lat/bwd 3laps each   HR/TR: x15  Hip PRE's 2x10 LEXI  Hip Circles: x10   Mini Squat: x20  HS Curl x20  LAQ:  x20  March 2 minutes at ladder   SKTC x30         Current Problem:  1. Hip fracture due to osteoporosis with routine healing  Follow Up In Physical Therapy      2. Closed subcapital fracture of left femur, initial encounter (Multi)  Follow Up In Physical Therapy              Pain:  2/10    Location: LB           Precautions: No recent falls

## 2024-06-24 ENCOUNTER — TREATMENT (OUTPATIENT)
Dept: PHYSICAL THERAPY | Facility: CLINIC | Age: 69
End: 2024-06-24
Payer: MEDICARE

## 2024-06-24 DIAGNOSIS — M80.059D HIP FRACTURE DUE TO OSTEOPOROSIS WITH ROUTINE HEALING: Primary | ICD-10-CM

## 2024-06-24 DIAGNOSIS — S72.012A CLOSED SUBCAPITAL FRACTURE OF LEFT FEMUR, INITIAL ENCOUNTER (MULTI): ICD-10-CM

## 2024-06-24 PROCEDURE — 97113 AQUATIC THERAPY/EXERCISES: CPT | Mod: GP,CQ

## 2024-06-24 NOTE — PROGRESS NOTES
"Patient Name: Starla Carlos  MRN: [id number]  Today's Date: 6/24/2024  Time Calculation  Start Time: 1259  Stop Time: 1329  Insurance  Visit #7  0% coins, $240 ded (met), no copay, bmn mekhi yr, no PA. // Lorena confirmed 5/13/24 10:38am Medicare A/B //[employer]ARP $0 spent towards therapy cap as of 5/8/24. KX mod required after .    Subjective:  Reports that she would like to come into PT 2x week .  States that she did not receive aquatic HEP . States that she has been performing pelvic floor exercise that she received in the past . L knee still sore from informed incident . \"But getting better\"     Objective:  Resent med bridge program (aquatic program)     Assessment:   Pt able to recall more exercises today. Progressed core  exercises to increase LE stability.     Plan:   Reeval NV . Discuss frequency at re eval     Treatment :   AQUATIC THERAPEUTIC EXERCISE 44870   Aquatic Gait Activities: Fwd/Lat/bwd 3laps each independent today  HR/TR: x15  Hip PRE's 2x10 LEXI (10x no UE ,10x with UE assist )   Hip Circles: x10   Mini Squat: x20  HS Curl x20  LAQ:  x20  March 2 laps   SKTC x30   Noodle pulldown x15   B/SL DB abduction x10 with teal and yellow dumbbell   SL/B  Db flexion decreased range x10      Current Problem:  1. Hip fracture due to osteoporosis with routine healing        2. Closed subcapital fracture of left femur, initial encounter (Multi)                Pain:  2/10    Location: L knee           Precautions: No recent falls        "

## 2024-07-01 ENCOUNTER — TREATMENT (OUTPATIENT)
Dept: PHYSICAL THERAPY | Facility: CLINIC | Age: 69
End: 2024-07-01
Payer: MEDICARE

## 2024-07-01 DIAGNOSIS — S72.012A CLOSED SUBCAPITAL FRACTURE OF LEFT FEMUR, INITIAL ENCOUNTER (MULTI): ICD-10-CM

## 2024-07-01 DIAGNOSIS — M80.059D HIP FRACTURE DUE TO OSTEOPOROSIS WITH ROUTINE HEALING: ICD-10-CM

## 2024-07-01 PROCEDURE — 97110 THERAPEUTIC EXERCISES: CPT | Mod: GP

## 2024-07-01 NOTE — PROGRESS NOTES
Physical Therapy    Physical Therapy Treatment    Patient Name: Starla Carlos  MRN: [id number]    Today's Date: 7/1/2024  Time Calculation  Start Time: 1430  Stop Time: 1458  Time Calculation (min): 28 min     PT Therapeutic Procedures Time Entry  Therapeutic Exercise Time Entry: 28                 Visit #8  0% coins, $240 ded (met), no copay, bmn mekhi yr, no PA. // Lorena confirmed 5/13/24 10:38am Medicare A/B //[employer]ARP $0 spent towards therapy cap as of 5/8/24. KX mod required after .    Assessment:  Patient has completed 8 PT visits and has achieved her POC goals. Conducted HEP review and issued handouts for land and aquatic based HEP. Encouraged patient to maintain regular mobility and safety vigilance to ensure her progress with strength, mobiltiy and balance. Patient verbalized understanding of all education and instruction today. Patient is discharged from PT.     Plan:   Discharge patient with land and aquatic HEP.    Current Problem  1. Hip fracture due to osteoporosis with routine healing  Follow Up In Physical Therapy      2. Closed subcapital fracture of left femur, initial encounter (Multi)  Follow Up In Physical Therapy                  Subjective    Pt reports she has been doing well. Pool was helpful in regaining function and strength. Currently ambulating without device.     Pain   0/10    Objective   Hip Musculoskeletal Exam  Gait    Gait is normal.    Strength    Right      Flexion: 4+/5.       Internal rotation: 4/5.       External rotation: 4/5.       Adduction: 4+/5.       Abduction: 4+/5.     Left      Flexion: 4+/5.       Internal rotation: 4/5.       External rotation: 4/5.       Adduction: 4+/5.       Abduction: 4+/5.              Outcome Measures:  Other Measures  Lower Extremity Funtional Score (LEFS): 66/80    Treatments:  Re-check billed as therex  Ambulation on track without device 2/10 mile  12 step staircase negotiation with reciprocal step pattern  Access Code: [email or web address]  URL:  [email or web address]  Date: 07/01/2024  Prepared by: Diaz Castañeda    Exercises  - Standing Hamstring Curl with Resistance  - 1 x daily - 7 x weekly - 2 sets - 10 reps  - Side Stepping with Resistance at Ankles and Counter Support  - 1 x daily - 7 x weekly - 2 sets - 10 reps  - Marching with Resistance  - 1 x daily - 7 x weekly - 2 sets - 10 reps    OP EDUCATION:   HEP, POC, Safety

## 2024-07-03 ENCOUNTER — DOCUMENTATION (OUTPATIENT)
Dept: PHYSICAL THERAPY | Facility: CLINIC | Age: 69
End: 2024-07-03
Payer: MEDICARE

## 2024-07-03 NOTE — PROGRESS NOTES
Physical Therapy    Discharge Summary    Name: Starla Carlos  MRN: [id number]  : 1955  Date: 24    Discharge Summary: PT    Discharge Information: Date of discharge 7/3/24, Date of last visit 24, Date of evaluation 5/15/24, and Number of attended visits 8    Discharge Status: Achieved POC goals, discharged with long term HEP    Rehab Discharge Reason: Achieved all and/or the most significant goals(s)

## 2024-07-23 ENCOUNTER — OFFICE VISIT (OUTPATIENT)
Dept: ORTHOPEDIC SURGERY | Facility: CLINIC | Age: 69
End: 2024-07-23
Payer: MEDICARE

## 2024-07-23 ENCOUNTER — HOSPITAL ENCOUNTER (OUTPATIENT)
Dept: RADIOLOGY | Facility: CLINIC | Age: 69
Discharge: HOME | End: 2024-07-23
Payer: MEDICARE

## 2024-07-23 DIAGNOSIS — S72.002S HIP FRACTURE REQUIRING OPERATIVE REPAIR, LEFT, SEQUELA: ICD-10-CM

## 2024-07-23 PROCEDURE — 73502 X-RAY EXAM HIP UNI 2-3 VIEWS: CPT | Mod: LT

## 2024-07-23 PROCEDURE — 99213 OFFICE O/P EST LOW 20 MIN: CPT | Performed by: ORTHOPAEDIC SURGERY

## 2024-07-23 PROCEDURE — 73502 X-RAY EXAM HIP UNI 2-3 VIEWS: CPT | Mod: LEFT SIDE | Performed by: ORTHOPAEDIC SURGERY

## 2024-07-23 NOTE — PROGRESS NOTES
Starla Carlos is a 68 y.o. female who presents for follow-up of left hip ORIF repair with a Synthes nail.    HPI:  68-year-old female who is 3 months out from a left hip ORIF repair with a Synthes nail.  She denies any fever chills nausea vomiting night sweats.  She has no bowel or bladder complaints.    Physical exam:  Patient has a steady gait.  She can rise from a seated position and sit to standing position.  The incision is very well-healed.  She has very good and appropriate hip range of motion throughout the left hip and upper extremity.  She has good sensation distally.  Muscle strength in the lower extremities is 5/5 and symmetric.  She is alert and oriented x 3, normal affect.  Well-kept well-nourished.  Coordination is normal.    Imaging studies:  2 views of the left hip were ordered and reviewed today.    Assessment:  68-year-old female here who is 3 months out from a left ORIF repair with a Synthes nail.  She is doing great.  She has no pain.  She is getting a little discomfort in her distal quadricep on the left and maybe a little bit in her calf but otherwise she is walking without any ambulating assistance devices.  She is engaging in all activities as tolerated.  She is doing quite well.    For complete plan and/or surgical details, please refer to Dr. Reyes's portion of this split dictation.    -Grzegorz Mena PA-C    In a face-to-face encounter, I performed a history and physical examination, discussed pertinent diagnostic studies if indicated, and discussed diagnosis and management strategies with both the patient and the midlevel provider.  I reviewed the midlevel's note and agree with the documented findings and plan of care.    Patient doing very well.  She is 3 months out from an ORIF left femoral neck fracture.  She wants to get back to playing pickle ball.  She is pain-free except some intermittent low back pain.  X-rays look like she is healed and fine.  We are going to have her get  back into physical therapy to get her into shape if she wants to play pickle ball.  We will see her back on a as needed basis.    Kael Reyes MD  Orthopedic surgery

## 2024-07-30 ENCOUNTER — APPOINTMENT (OUTPATIENT)
Dept: PHYSICAL THERAPY | Facility: CLINIC | Age: 69
End: 2024-07-30
Payer: MEDICARE

## 2024-08-01 ENCOUNTER — TREATMENT (OUTPATIENT)
Dept: PHYSICAL THERAPY | Facility: CLINIC | Age: 69
End: 2024-08-01
Payer: MEDICARE

## 2024-08-01 DIAGNOSIS — S72.012A CLOSED SUBCAPITAL FRACTURE OF LEFT FEMUR, INITIAL ENCOUNTER (MULTI): ICD-10-CM

## 2024-08-01 DIAGNOSIS — M80.059D HIP FRACTURE DUE TO OSTEOPOROSIS WITH ROUTINE HEALING: ICD-10-CM

## 2024-08-01 PROCEDURE — 97164 PT RE-EVAL EST PLAN CARE: CPT | Mod: GP,59

## 2024-08-01 PROCEDURE — 97110 THERAPEUTIC EXERCISES: CPT | Mod: GP

## 2024-08-01 NOTE — PROGRESS NOTES
Physical Therapy    Physical Therapy Evaluation and Treatment      Patient Name: Starla Carlos  MRN: [id number]  Today's Date: 8/1/2024    Time Entry:   Time Calculation  Start Time: 0845  Stop Time: 0914  Time Calculation (min): 29 min  PT Evaluation Time Entry  PT Re-Evaluation Time Entry: 19  PT Therapeutic Procedures Time Entry  Therapeutic Exercise Time Entry: 10                 Visit #1  0% coins, $240 ded (met), no copay, bmn mekhi yr, no PA     Assessment:    Pt returns to PT after being discharged from previous POC with reports of weakness and desire to return to jogging and pickleball at local rec center. Pt demonstrates mild weakness in her hips and knees but no major impairments noted with today's re-assessment. Will follow-up with patient in 1 week to assess new HEP performance and patient will continue HEP with local  at rec center after. Patient verbalized understanding of all education and instruction today.     Plan:  OP PT Plan  Treatment/Interventions: Therapeutic activities, Therapeutic exercises, Taping techniques, Neuromuscular re-education, Gait training  PT Plan: Skilled PT  PT Frequency: Follow-up visit only    Current Problem:   1. Hip fracture due to osteoporosis with routine healing  Follow Up In Physical Therapy      2. Closed subcapital fracture of left femur, initial encounter (Multi)  Follow Up In Physical Therapy          Subjective    General:  Pt returns to PT after being discharged from previous POC with reports of weakness and desire to return to jogging and pickleball at local rec center. Pt saw Dr. Reyes for a follow-up visit for L  hip ORIF ~3 months ago and has been referred back to PT to improve strength to resume jogging and pickle ball play. Patient denies any falls or injuries during absence from PT.       Pain:   1/10 L knee       Objective   Knee Musculoskeletal Exam    Range of Motion    Right      Active extension: 0      Active flexion: 125    Left      Active  extension: 0      Active flexion: 125    Strength    Right      Extension: 4/5.       Flexion: 4/5.     Left      Extension: 4/5.       Flexion: 4/5.     Special Signs    Right      Straight leg raise: normal    Left      Straight leg raise: normal     Hip Musculoskeletal Exam    Strength    Right      Extension: 4/5.       Flexion: 4/5.       Internal rotation: 4/5.       External rotation: 4/5.       Adduction: 4+/5.       Abduction: 4+/5.     Left      Extension: 4/5.       Flexion: 4/5.       Internal rotation: 4/5.       External rotation: 4/5.       Adduction: 4+/5.       Abduction: 4+/5.     Special Tests    Right      Internal rotation: negative      External rotation: negative    Left      Internal rotation: negative      External rotation: negative        Outcome Measures:  Other Measures  Lower Extremity Funtional Score (LEFS): 52/80     Treatments:  Jogging in place 2 min  Jogging on track 900 ft  Side stepping with Black TB x10 ea  Goblet squats with 15# 2x10  Alternating fwd split squats 12# x10 ea    EDUCATION:  Outpatient Education  Individual(s) Educated: Patient  Education Provided: Home Exercise Program, POC, Home Safety, Body Mechanics

## 2024-08-06 ENCOUNTER — APPOINTMENT (OUTPATIENT)
Dept: PHYSICAL THERAPY | Facility: CLINIC | Age: 69
End: 2024-08-06
Payer: MEDICARE

## 2024-08-27 ENCOUNTER — APPOINTMENT (OUTPATIENT)
Dept: PHYSICAL THERAPY | Facility: HOSPITAL | Age: 69
End: 2024-08-27
Payer: MEDICARE

## 2024-09-03 ENCOUNTER — CLINICAL SUPPORT (OUTPATIENT)
Dept: PHYSICAL THERAPY | Facility: HOSPITAL | Age: 69
End: 2024-09-03
Payer: MEDICARE

## 2024-09-03 DIAGNOSIS — M80.059D HIP FRACTURE DUE TO OSTEOPOROSIS WITH ROUTINE HEALING: ICD-10-CM

## 2024-09-03 DIAGNOSIS — M62.89 HIGH-TONE PELVIC FLOOR DYSFUNCTION IN FEMALE: Primary | ICD-10-CM

## 2024-09-03 DIAGNOSIS — M53.3 COCCYX PAIN: ICD-10-CM

## 2024-09-03 PROCEDURE — 97140 MANUAL THERAPY 1/> REGIONS: CPT | Mod: 59,GP

## 2024-09-03 PROCEDURE — 97110 THERAPEUTIC EXERCISES: CPT | Mod: GP

## 2024-09-03 PROCEDURE — 97161 PT EVAL LOW COMPLEX 20 MIN: CPT | Mod: GP

## 2024-09-03 ASSESSMENT — PAIN - FUNCTIONAL ASSESSMENT: PAIN_FUNCTIONAL_ASSESSMENT: 0-10

## 2024-09-03 ASSESSMENT — PAIN SCALES - GENERAL: PAINLEVEL_OUTOF10: 4

## 2024-09-03 ASSESSMENT — PAIN DESCRIPTION - DESCRIPTORS: DESCRIPTORS: ACHING

## 2024-09-03 NOTE — LETTER
September 3, 2024    NNEKA Thompson  [address] [facility] [place] [address]    Patient: Starla Carlos   YOB: 1955   Date of Visit: 9/3/2024       Dear Kael Reyes MD  [address]   [facility], [address] [place] [address],  [place] [address]    The attached plan of care is being sent to you because your patient’s medical reimbursement requires that you certify the plan of care. Your signature is required to allow uninterrupted insurance coverage.      You may indicate your approval by signing below and faxing this form back to us at Dept Fax: [phone].    Please call Dept: [phone] with any questions or concerns.    Thank you for this referral,        Jada Christianson PT  [address] MEDICAL OFFICE [address] [address]    Payer: Payor: MEDICARE / Plan: MEDICARE PART A AND B / Product Type: *No Product type* /                                                                         Date:     Dear Jada Christianson, PT,     Re: Ms. Starla Carlos, MRN:[id number]    I certify that I have reviewed the attached plan of care and it is medically necessary for Ms. Starla Carlos (1955) who is under my care.          ______________________________________                    _________________  Provider name and credentials                                           Date and time                                                                                           Plan of Care 9/3/24   Effective from: 9/3/2024  Effective to: 12/2/2024    Plan ID: [id number]            Participants as of Finalize on 9/3/2024    Name Type Comments Contact Info    Jada Christianson PT Physical Therapist  [phone]    NNEKA Thompson PCP - General  [phone]       Last Plan Note     Author: Jada Christianson PT Status: Incomplete Last edited: 9/3/2024  9:45 AM       Physical Therapy Evaluation and Treatment       Patient Name: Starla Carlos  MRN: [id number]  Today's Date: 9/3/2024    Time Entry:   Time Calculation  Start Time: 945  Stop Time: 5  Time Calculation (min): 60 min  PT Evaluation Time Entry  PT Evaluation (Low) Time Entry: 35  PT Therapeutic Procedures Time Entry  Manual Therapy Time Entry: 10  Therapeutic Exercise Time Entry: 10  Self-Care/Home Mgmt Trainin                 INS MEDICARE/ AARP 2230 ($0 USED ) COPAY 0 (MET) COVERAGE 80/100 OOP 0 AARP TO FOLLOW MEDICARE NO AUTH REQ [id number]   MCR CERT: 9/3/24-24  Visit #1    Assessment:    Starla Carlos is a 68 y.o.  female presenting with c/o coccyx pain and pain with penetration consistent with diagnosis of high tone PFD and dyspareunia. Upon examination patient demonstrates impaired balance, impaired lumbar and hip ROM, hip and core strength, asymmetric pelvic alignment and poor coordination, strength and tone of the PF contributing to coccyx pain. Functional limitations and participations restrictions include sitting prolonged periods, driving, intercourse and participating in medical examinations.  The clinical presentation of this patient is stable and their history and examination findings are consistent with a low complexity evaluation with excellent rehab potential. Pt will benefit from skilled PT intervention 1 x/week for 8 weeks to address limitations listed above and achieve desired goals.    Plan:  OP PT Plan  Treatment/Interventions: Biofeedback, Cryotherapy, Dry needling, Education/ Instruction, Electrical stimulation, Gait training, Hot pack, Manual therapy, Neuromuscular re-education, Self care/ home management, Taping techniques, Therapeutic activities, Therapeutic exercises, Ultrasound, Vasopneumatic device  PT Plan: Skilled PT  PT Frequency: 1 time per week  Duration: 8 weeks  Onset Date: 20  Certification Period Start Date: 24  Certification Period End Date: 24  Number of Treatments Authorized: MEDICARE/  "AARP 2230 ($0 USED ) COPAY 0 (MET) COVERAGE 80/100 OOP 0 AARP TO FOLLOW MEDICARE NO AUTH REQ [id number]  Rehab Potential: Excellent  Plan of Care Agreement: Patient  NV: int releases / BF    Current Problem:   1. High-tone pelvic floor dysfunction in female        2. Hip fracture due to osteoporosis with routine healing  Follow Up In Physical Therapy      3. Coccyx pain            Subjective   General:    Had L5/S1 laminectomy when she was 42. During COVID she sat for a long time she ended up getting \"COVID Butt\" and reports her pelvis shifted. Chiropractor said she had to keep up with her exercises. When she sits she feels like she is sitting on her sacrum.   Went on vacation and wasn't sitting as much and things seemed to get better.   Back injury feels better and hasn't been having hip going out.   + hx of falling on a melissa in track on her tailbone and falling on ice when she was young.   General  Reason for Referral: Pelvic Floor  Referred By: Nirav  Date of onset: 1/1/2020    Pain: Tailbone pain and radiates to low back (ache)         Best: 0/10 standing, walking, donut pillow with ice pack         Worst: 7/10 sitting prolonged periods, sitting on hard surfaces, driving, traveling         Current: 4/10  Imaging performed? FINDINGS:  AP pelvis lateral x-ray of the left hip show ORIF of a left femoral  neck fracture. Implants in good position and the fracture is nicely  reduced and appears to be healed. There is no fractures of the pelvis  or proximal femurs bilaterally. There is mild pubic symphysis and SI  joint degenerative changes. There is mild right-greater-than-left hip  joint degenerative changes. There is no soft tissue abnormalities.  Pelvic Pain/ Pain with insertion/sex: + hx of pain with penetration due to dryness (friction burn feeling) - not taking her estradiol cream     POP s/s:         Bulging sensation: no         Heaviness/pressure: no     Bladder Habits:         DTF: every 4 " hours         NTF: 0x/night         Leakage: yes                  Amount:     Few drops                       Aggravating factors: full bladder and very strong urge walking to bathroom (a few times/week)         Urge: mild and build        Difficulty initiating stream: yes over last month did have a hard.         Difficulty emptying bladder: yes         Pain with urination: Gets frequent yeast infections and UTIs with some (-) cultures / + current burning in the urethra     Bowel Habits: Denies bowel issues         Average # BM/day: Reports 1x/day normal         Pain with BM? No         Trouble holding back gas? yes        Suffer from constipation/diarrhea? Denies difficulty emptying bowel         Leakage: denies     Diet and Lifestyle:         Caffeine intake: 1.5 cups coffee         H2O intake: 60 oz water         Smoking: no         Alcohol: wine when she wants it - when she needs to relax         Special diets? Denies     Prior Health History:   health conditions: denies  surgeries: Hx of left Hip ORIF repair with a Synthes nail ~4 months ago.   # pregnancies:  3 vaginal deliveries (1st breach and face up with tearing), other 2 were easy     Occupation: retired  Hobbies: pickleball, running   Goals for PT: reduce tailbone pain and improve pain with penetration. Wants to enjoy her        Precautions:     Pain:  Pain Assessment  Pain Assessment: 0-10  0-10 (Numeric) Pain Score: 4  Pain Type: Chronic pain  Pain Location: Coccyx  Pain Descriptors: Aching  Home Living:  Home Living  Home Living Comment: NA  Prior Level of Function:  Prior Function Per Pt/Caregiver Report  Level of Avery: Independent with ADLs and functional transfers    Objective    Lumbar ROM       Flexion: fingers to floor        Extension: 5* with L knee pain        Sidebending       L    50%    R 75%       Rotation     75% B    SLS: 10sec B with significant balance strategies on the L    MMT:           Hip Flex       L    4     R  4          Hip Ext         L    4     R 4          Hip ABD       L    4-     R 4-          Hip ADD       L     4-    R 4-          Hip IR           L     4    R 4          Hip ER          L     4-    R 4    ROM:          Hip Flex       L   90      R 110          Hip Ext         L         R           Hip ABD       L    30     R 45          Hip IR           L     neutral    R 15          Hip ER          L     45    R 55    Pelvic alignment: not assessed     Palpation: TTP coccyx, L>R glute med, glute max, piriformis    Therapist obtained informed consent from pt prior to engaging in pelvic exam. Pt educated on use of external and internal pelvic exam to assess external vulvar AND/OR rectal structures, internal PFM strength, endurance, muscle tone and PFM activation with stress on pelvic floor. Pt agreeable to participation in internal pelvic exam.    Observation:       Skin integrity/symmetry: WNL       Contract: sig glute and abdominal strategy       Relax: WNL       Bulge: unable       Cough: not assessed     External palpation (Tone/TTP): Inc tone B glutes    Internal palpation (Tone/TTP): L sided increased tone and mod tenderness       EAS WNL tone       IAS WNL tone       Levator ani inc tone and ttp L side       Coccygeus inc tone and ttp L side       Obturator internus inc tone and ttp L side     PERF scoring      Power: 1/5       Endurance: not assessed 2/2 poor isolation       Repetitions: not assessed 2/2 poor isolation       Quick flicks: not assessed 2/2 poor isolation       Outcome Measures:  NIH CPSI: 24     Treatments:  Therex: 10 min   Access Code: [email or web address]  URL: [email or web address]  Date: 09/03/2024  Prepared by: Jada Christianson    Exercises  - Supine Piriformis Stretch with Foot on Ground  - 1 x daily - 7 x weekly - 2 reps - 30-60sec hold  - Supine Diaphragmatic Breathing  - 1 x daily - 7 x weekly - 3 sets - 10 reps  - Cat Cow  - 1 x daily - 7 x weekly - 3 sets - 10  reps    Manual: 10 min   Coccyx mobilizations   TrP releases to L LA, coccygeus to reduce mm spasm contributing to tailbone pain.     EDUCATION/SC: 5 min    Pt educated on use of pelvic wand with lubricant for PFM stretching to allow for elasticity in PFMs and reduce pain and tearing with vaginal penetration. Pt also educated on use of pelvic wand for reducing trigger points in pelvic floor musculature contributing to pelvic pain.    Goals:  STGs: 4 weeks     Pt will be able to isolate pelvic floor through ability to contract and relax PFMs on cue to improve lumbopelvic stability and continence.    Pt will report compliance with vaginal dilators OR pelvic wand use to promote improved tissue mobility and blood flow for reduced pelvic pain.     Pt will be compliant with participation in home exercise program 3-5x/week with good body mechanics to facilitate independent rehab program upon discharge.    LTGs: 8 weeks     Pt will show improvement of PFM strength by 1 MMT grade to allow for improved ability to support change in pressures in abdomen and thorax to reduce incontinence.    Pt will show improvement of PFM endurance to 15 seconds to allow for improved ability to support change in pressures in abdomen and thorax to reduce incontinence.     Pt will demonstrate improved TA activation to 15sec for reduced back pain with functional activities.     Pt will report 75% improvement in vaginal pain with penetration to improve participation in medical examinations and intercourse.     Pt will report 75% improvement in coccyx pain to allow for sitting prolonged periods.     Pt will demonstrate improved strength in all deficient musculature by 1/2 MMT grade to allow for ability to perform stairs without discomfort.     Pt will report decreased NIH-CPSI score by 6 points (MCID) to demonstrate improved QOL and ability to perform ADLs. Baseline: 24             Current Participants as of 9/3/2024    Name Type Comments Contact  Info    Jada Christianson, PT Physical Therapist  [phone]    Signature pending    NNEKA Thompson PCP - General  [phone]    Signature pending

## 2024-09-03 NOTE — PROGRESS NOTES
Physical Therapy Evaluation and Treatment      Patient Name: Starla Carlos  MRN: [id number]  Today's Date: 9/3/2024    Time Entry:   Time Calculation  Start Time: 945  Stop Time: 5  Time Calculation (min): 60 min  PT Evaluation Time Entry  PT Evaluation (Low) Time Entry: 35  PT Therapeutic Procedures Time Entry  Manual Therapy Time Entry: 10  Therapeutic Exercise Time Entry: 10  Self-Care/Home Mgmt Trainin                 INS MEDICARE/ AARP 2230 ($0 USED ) COPAY 0 (MET) COVERAGE 80/100 OOP 0 AARP TO FOLLOW MEDICARE NO AUTH REQ [id number]   MCR CERT: 9/3/24-24  Visit #1    Assessment:    Starla Carlos is a 68 y.o.  female presenting with c/o coccyx pain and pain with penetration consistent with diagnosis of high tone PFD and dyspareunia. Upon examination patient demonstrates impaired balance, impaired lumbar and hip ROM, hip and core strength, asymmetric pelvic alignment and poor coordination, strength and tone of the PF contributing to coccyx pain. Functional limitations and participations restrictions include sitting prolonged periods, driving, intercourse and participating in medical examinations.  The clinical presentation of this patient is stable and their history and examination findings are consistent with a low complexity evaluation with excellent rehab potential. Pt will benefit from skilled PT intervention 1 x/week for 8 weeks to address limitations listed above and achieve desired goals.    Plan:  OP PT Plan  Treatment/Interventions: Biofeedback, Cryotherapy, Dry needling, Education/ Instruction, Electrical stimulation, Gait training, Hot pack, Manual therapy, Neuromuscular re-education, Self care/ home management, Taping techniques, Therapeutic activities, Therapeutic exercises, Ultrasound, Vasopneumatic device  PT Plan: Skilled PT  PT Frequency: 1 time per week  Duration: 8 weeks  Onset Date: 20  Certification Period Start Date: 24  Certification Period End Date:  "12/02/24  Number of Treatments Authorized: MEDICARE/ AARP 2230 ($0 USED ) COPAY 0 (MET) COVERAGE 80/100 OOP 0 AARP TO FOLLOW MEDICARE NO AUTH REQ [id number]  Rehab Potential: Excellent  Plan of Care Agreement: Patient  NV: int releases / BF    Current Problem:   1. High-tone pelvic floor dysfunction in female        2. Hip fracture due to osteoporosis with routine healing  Follow Up In Physical Therapy      3. Coccyx pain            Subjective    General:    Had L5/S1 laminectomy when she was 42. During COVID she sat for a long time she ended up getting \"COVID Butt\" and reports her pelvis shifted. Chiropractor said she had to keep up with her exercises. When she sits she feels like she is sitting on her sacrum.   Went on vacation and wasn't sitting as much and things seemed to get better.   Back injury feels better and hasn't been having hip going out.   + hx of falling on a melissa in track on her tailbone and falling on ice when she was young.   General  Reason for Referral: Pelvic Floor  Referred By: Nirav  Date of onset: 1/1/2020    Pain: Tailbone pain and radiates to low back (ache)         Best: 0/10 standing, walking, donut pillow with ice pack         Worst: 7/10 sitting prolonged periods, sitting on hard surfaces, driving, traveling         Current: 4/10  Imaging performed? FINDINGS:  AP pelvis lateral x-ray of the left hip show ORIF of a left femoral  neck fracture. Implants in good position and the fracture is nicely  reduced and appears to be healed. There is no fractures of the pelvis  or proximal femurs bilaterally. There is mild pubic symphysis and SI  joint degenerative changes. There is mild right-greater-than-left hip  joint degenerative changes. There is no soft tissue abnormalities.  Pelvic Pain/ Pain with insertion/sex: + hx of pain with penetration due to dryness (friction burn feeling) - not taking her estradiol cream     POP s/s:         Bulging sensation: no         " Heaviness/pressure: no     Bladder Habits:         DTF: every 4 hours         NTF: 0x/night         Leakage: yes                  Amount:     Few drops                       Aggravating factors: full bladder and very strong urge walking to bathroom (a few times/week)         Urge: mild and build        Difficulty initiating stream: yes over last month did have a hard.         Difficulty emptying bladder: yes         Pain with urination: Gets frequent yeast infections and UTIs with some (-) cultures / + current burning in the urethra     Bowel Habits: Denies bowel issues         Average # BM/day: Reports 1x/day normal         Pain with BM? No         Trouble holding back gas? yes        Suffer from constipation/diarrhea? Denies difficulty emptying bowel         Leakage: denies     Diet and Lifestyle:         Caffeine intake: 1.5 cups coffee         H2O intake: 60 oz water         Smoking: no         Alcohol: wine when she wants it - when she needs to relax         Special diets? Denies     Prior Health History:   health conditions: denies  surgeries: Hx of left Hip ORIF repair with a Synthes nail ~4 months ago.   # pregnancies:  3 vaginal deliveries (1st breach and face up with tearing), other 2 were easy     Occupation: retired  Hobbies: pickleball, running   Goals for PT: reduce tailbone pain and improve pain with penetration. Wants to enjoy her        Precautions:     Pain:  Pain Assessment  Pain Assessment: 0-10  0-10 (Numeric) Pain Score: 4  Pain Type: Chronic pain  Pain Location: Coccyx  Pain Descriptors: Aching  Home Living:  Home Living  Home Living Comment: NA  Prior Level of Function:  Prior Function Per Pt/Caregiver Report  Level of Park: Independent with ADLs and functional transfers    Objective     Lumbar ROM       Flexion: fingers to floor        Extension: 5* with L knee pain        Sidebending       L    50%    R 75%       Rotation     75% B    SLS: 10sec B with significant balance  strategies on the L    MMT:           Hip Flex       L    4     R 4          Hip Ext         L    4     R 4          Hip ABD       L    4-     R 4-          Hip ADD       L     4-    R 4-          Hip IR           L     4    R 4          Hip ER          L     4-    R 4    ROM:          Hip Flex       L   90      R 110          Hip Ext         L         R           Hip ABD       L    30     R 45          Hip IR           L     neutral    R 15          Hip ER          L     45    R 55    Pelvic alignment: not assessed     Palpation: TTP coccyx, L>R glute med, glute max, piriformis    Therapist obtained informed consent from pt prior to engaging in pelvic exam. Pt educated on use of external and internal pelvic exam to assess external vulvar AND/OR rectal structures, internal PFM strength, endurance, muscle tone and PFM activation with stress on pelvic floor. Pt agreeable to participation in internal pelvic exam.    Observation:       Skin integrity/symmetry: WNL       Contract: sig glute and abdominal strategy       Relax: WNL       Bulge: unable       Cough: not assessed     External palpation (Tone/TTP): Inc tone B glutes    Internal palpation (Tone/TTP): L sided increased tone and mod tenderness       EAS WNL tone       IAS WNL tone       Levator ani inc tone and ttp L side       Coccygeus inc tone and ttp L side       Obturator internus inc tone and ttp L side     PERF scoring      Power: 1/5       Endurance: not assessed 2/2 poor isolation       Repetitions: not assessed 2/2 poor isolation       Quick flicks: not assessed 2/2 poor isolation       Outcome Measures:  NIH CPSI: 24     Treatments:  Therex: 10 min   Access Code: [email or web address]  URL: [email or web address]  Date: 09/03/2024  Prepared by: Jada Christianson    Exercises  - Supine Piriformis Stretch with Foot on Ground  - 1 x daily - 7 x weekly - 2 reps - 30-60sec hold  - Supine Diaphragmatic Breathing  - 1 x daily - 7 x weekly - 3 sets -  10 reps  - Cat Cow  - 1 x daily - 7 x weekly - 3 sets - 10 reps    Manual: 10 min   Coccyx mobilizations   TrP releases to L LA, coccygeus to reduce mm spasm contributing to tailbone pain.     EDUCATION/SC: 5 min    Pt educated on use of pelvic wand with lubricant for PFM stretching to allow for elasticity in PFMs and reduce pain and tearing with vaginal penetration. Pt also educated on use of pelvic wand for reducing trigger points in pelvic floor musculature contributing to pelvic pain.    Goals:  STGs: 4 weeks     Pt will be able to isolate pelvic floor through ability to contract and relax PFMs on cue to improve lumbopelvic stability and continence.    Pt will report compliance with vaginal dilators OR pelvic wand use to promote improved tissue mobility and blood flow for reduced pelvic pain.     Pt will be compliant with participation in home exercise program 3-5x/week with good body mechanics to facilitate independent rehab program upon discharge.    LTGs: 8 weeks     Pt will show improvement of PFM strength by 1 MMT grade to allow for improved ability to support change in pressures in abdomen and thorax to reduce incontinence.    Pt will show improvement of PFM endurance to 15 seconds to allow for improved ability to support change in pressures in abdomen and thorax to reduce incontinence.     Pt will demonstrate improved TA activation to 15sec for reduced back pain with functional activities.     Pt will report 75% improvement in vaginal pain with penetration to improve participation in medical examinations and intercourse.     Pt will report 75% improvement in coccyx pain to allow for sitting prolonged periods.     Pt will demonstrate improved strength in all deficient musculature by 1/2 MMT grade to allow for ability to perform stairs without discomfort.     Pt will report decreased NIH-CPSI score by 6 points (MCID) to demonstrate improved QOL and ability to perform ADLs. Baseline: 24

## 2024-09-10 ENCOUNTER — TREATMENT (OUTPATIENT)
Dept: PHYSICAL THERAPY | Facility: HOSPITAL | Age: 69
End: 2024-09-10
Payer: MEDICARE

## 2024-09-10 DIAGNOSIS — M62.89 HIGH-TONE PELVIC FLOOR DYSFUNCTION IN FEMALE: Primary | ICD-10-CM

## 2024-09-10 DIAGNOSIS — M80.059D HIP FRACTURE DUE TO OSTEOPOROSIS WITH ROUTINE HEALING: ICD-10-CM

## 2024-09-10 DIAGNOSIS — M53.3 COCCYX PAIN: ICD-10-CM

## 2024-09-10 PROCEDURE — 97112 NEUROMUSCULAR REEDUCATION: CPT | Mod: GP

## 2024-09-10 PROCEDURE — 97140 MANUAL THERAPY 1/> REGIONS: CPT | Mod: GP

## 2024-09-10 PROCEDURE — 97535 SELF CARE MNGMENT TRAINING: CPT | Mod: GP

## 2024-09-10 ASSESSMENT — PAIN SCALES - GENERAL: PAINLEVEL_OUTOF10: 3

## 2024-09-10 ASSESSMENT — PAIN DESCRIPTION - DESCRIPTORS: DESCRIPTORS: ACHING

## 2024-09-10 ASSESSMENT — PAIN - FUNCTIONAL ASSESSMENT: PAIN_FUNCTIONAL_ASSESSMENT: 0-10

## 2024-09-10 NOTE — PROGRESS NOTES
Physical Therapy Evaluation and Treatment      Patient Name: Starla Carlos  MRN: [id number]  Today's Date: 9/10/2024    Time Entry:   Time Calculation  Start Time: 0945  Stop Time: 1045  Time Calculation (min): 60 min     PT Therapeutic Procedures Time Entry  Manual Therapy Time Entry: 20  Neuromuscular Re-Education Time Entry: 30  Self-Care/Home Mgmt Training: 10                 INS MEDICARE/ AARP 2230 ($0 USED ) COPAY 0 (MET) COVERAGE 80/100 OOP 0 AARP TO FOLLOW MEDICARE NO AUTH REQ [id number]/ALL   MCR CERT: 9/3/24-12/2/24  Visit #2    Assessment:    Pt tolerated internal releases well today with subjective reports of muscle relaxation. Initiated NMR today to improve PFM awareness of relax v contract with + response noted and ability to reduce abdominal compensation. Pt demos asymmetric pelvic alignment addressed with MET with + response noted. Pt reports 0/10 pain at end of session.     Plan:     NV: int releases / BF  Urge suppression techniques response?   Assess pelvic alignment       Current Problem:   1. High-tone pelvic floor dysfunction in female        2. Hip fracture due to osteoporosis with routine healing  Follow Up In Physical Therapy      3. Coccyx pain              Subjective    General:    Tailbone pain: reports it is doing better - denies getting wand yet but plans on getting it. Reports a little burning discomfort at top of sacrum that is 3/10.   Pain with penetration:   UUI: Has been good and has maybe had a little leakage with urge.     Reports things have been feeling a little better. Overdid it on her left hip with doing a lot of cleaning and stairs and her left pain has been really bad lately. Points to L>R SIJ for pain currently.       Precautions:   NONE   Pain:  Pain Assessment  Pain Assessment: 0-10  0-10 (Numeric) Pain Score: 3  Pain Location: Coccyx  Pain Descriptors: Aching      Objective   L anterior pelvic rotation     Treatments:    NMR: 30 min   Vaginal/Rectal neuromuscular  reeducation performed to improve neuromotor control and coordination of PFMs to allow for improved mm contraction, relaxation and endurance.    Baseline: 0.8 after 60 seconds   [id number]:   Work   3.2    Rest:    2.4  Rise   [id number]:   Work    7.6    Rest:     1.9   Rise  (abdominal compensation initially with improvement with Vcs for isolation)     Manual: 20 min   TrP releases to L LA, coccygeus to reduce mm spasm contributing to tailbone pain.     Assessment of pelvic alignment with MET to correct:   - L anterior pelvic rotation     Self care: 10 min   Urge: Pt educated on urge urinary incontinence regarding increased bladder sensitivity to filling of urine 2/2 training to hold reduced urine volume signaling micturition reflex.     Behavioral training: Pt educated on behavioral interventions to retrain bladder to reduce urge, frequency and incontinence.              Pt educated on urge suppression techniques (perineal pressure, kegels, ankle pumps) to reduce urinary urge and retrain bladder to retain increased urine volume.             Pt educated on limiting bladder irritants to reduce bladder lining irritation contributing to increased urge as well as increasing H2O intake to reduce urine concentration that may be contributing to bladder irritation.             Pt educated on increasing time between voids to allow for increased urine volume in bladder to retrain the bladder to hold more urine, reduce frequency and urge to void.    HEP   Access Code: [email or web address]  URL: [email or web address]  Date: 09/03/2024  Prepared by: Jada Christianson    - Supine Piriformis Stretch with Foot on Ground  - 1 x daily - 7 x weekly - 2 reps - 30-60sec hold  - Supine Diaphragmatic Breathing  - 1 x daily - 7 x weekly - 3 sets - 10 reps  - Cat Cow  - 1 x daily - 7 x weekly - 3 sets - 10 reps

## 2024-09-13 ENCOUNTER — OFFICE VISIT (OUTPATIENT)
Dept: ORTHOPEDIC SURGERY | Facility: CLINIC | Age: 69
End: 2024-09-13
Payer: MEDICARE

## 2024-09-13 ENCOUNTER — HOSPITAL ENCOUNTER (OUTPATIENT)
Dept: RADIOLOGY | Facility: CLINIC | Age: 69
Discharge: HOME | End: 2024-09-13
Payer: MEDICARE

## 2024-09-13 DIAGNOSIS — S72.002S HIP FRACTURE REQUIRING OPERATIVE REPAIR, LEFT, SEQUELA: ICD-10-CM

## 2024-09-13 DIAGNOSIS — S72.002S HIP FRACTURE REQUIRING OPERATIVE REPAIR, LEFT, SEQUELA: Primary | ICD-10-CM

## 2024-09-13 PROCEDURE — 99213 OFFICE O/P EST LOW 20 MIN: CPT | Performed by: ORTHOPAEDIC SURGERY

## 2024-09-13 PROCEDURE — 73502 X-RAY EXAM HIP UNI 2-3 VIEWS: CPT | Mod: LT

## 2024-09-13 NOTE — PROGRESS NOTES
Starla Carlos is a 68 y.o. female who presents for No chief complaint on file..    HPI:  68-year-old female here for follow-up.  She is about 4-1/2 months out from a left ORIF repair with a Synthes femoral neck system.  She denies any fever chills nausea vomiting night sweats.  She has no bowel or bladder complaints.    Physical exam:  Well-nourished, well kept.No lymphangitis or lymphadenopathy in the examined extremities.  Gait normal.  Can stand on heels and toes.   Examination of the back shows no tenderness in the paraspinous musculature.  There is no decreased range of motion in all directions due to guarding/muscle spasms and pain at extremes.  There is good strength and no instability.  Examination of the lower extremities reveals no point tenderness, swelling, or deformity.  Range of motion of the hips, knees, and ankles are full without crepitance, instability, or exacerbation of pain she is tender over the lateral aspect of her left hip.  Strength is 5/5 throughout.  No redness, abrasions, or lesions on extremities  Gross sensation intact in the extremities.   Affect normal.  Alert and oriented ×3.  Coordination normal.    Imaging studies:  2 views of the left hip were obtained and reviewed today.    Assessment:  T8-year-old female here for follow-up.  She is about 4-1/2 months or so out from a left ORIF repair with a Synthes femoral neck system.  At her last visit in July she was as needed for follow-up.  She has been really overdoing it lately, they are putting their house up on the market and she has been doing a lot of lifting twisting bending and moving things.  She is having left lateral hip pain and a little bit of pain just posterior of the greater trochanteric bursa on the left.  She is not describing any radiating pain or back pain.  She wants to get back into playing pickle ball and she is just hoping she has not damaged her surgical repair.    Have ordered and reviewed tests today, x-ray.  This  is a new undiagnosed problem with uncertain prognosis, new onset left hip pain after hip surgery.    For complete plan and/or surgical details, please refer to Dr. Reyes's portion of this split dictation.    -Grzegorz Mena PA-C    In a face-to-face encounter, I performed a history and physical examination, discussed pertinent diagnostic studies if indicated, and discussed diagnosis and management strategies with both the patient and the midlevel provider.  I reviewed the midlevel's note and agree with the documented findings and plan of care.    4-1/2 months out Synthes femoral neck system left.  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  She has bilateral buttock and gluteal and hamstring pain.  Wanted to make sure things okay on the left side.  X-rays look perfect and it looks like she is healed and that femoral neck fracture completely.  At this point I do not see anything for her to worry about.  We can let her engage in activities as tolerated and she can follow-up with us on a as needed basis for this new acute problem of uncertain prognosis.  X-rays were taken and reviewed today    Kael Reyes MD  Orthopedic surgery

## 2024-09-17 ENCOUNTER — TREATMENT (OUTPATIENT)
Dept: PHYSICAL THERAPY | Facility: HOSPITAL | Age: 69
End: 2024-09-17
Payer: MEDICARE

## 2024-09-17 DIAGNOSIS — M62.89 HIGH-TONE PELVIC FLOOR DYSFUNCTION IN FEMALE: Primary | ICD-10-CM

## 2024-09-17 DIAGNOSIS — M80.059D HIP FRACTURE DUE TO OSTEOPOROSIS WITH ROUTINE HEALING: ICD-10-CM

## 2024-09-17 DIAGNOSIS — M53.3 COCCYX PAIN: ICD-10-CM

## 2024-09-17 PROCEDURE — 97110 THERAPEUTIC EXERCISES: CPT | Mod: GP

## 2024-09-17 PROCEDURE — 97530 THERAPEUTIC ACTIVITIES: CPT | Mod: GP

## 2024-09-17 PROCEDURE — 97140 MANUAL THERAPY 1/> REGIONS: CPT | Mod: GP

## 2024-09-17 NOTE — PROGRESS NOTES
Physical Therapy Evaluation and Treatment      Patient Name: Starla Carlos  MRN: [id number]  Today's Date: 9/17/2024    Time Entry:   Time Calculation  Start Time: 0945  Stop Time: 1043  Time Calculation (min): 58 min     PT Therapeutic Procedures Time Entry  Manual Therapy Time Entry: 20  Therapeutic Exercise Time Entry: 23  Therapeutic Activity Time Entry: 10                 INS MEDICARE/ AARP 2230 ($0 USED ) COPAY 0 (MET) COVERAGE 80/100 OOP 0 AARP TO FOLLOW MEDICARE NO AUTH REQ [id number]/ALL   MCR CERT: 9/3/24-12/2/24  Visit #3    Assessment:    Assessed pelvic floor in supine and standing due to increased rectal pressure lately. POP not seen and likely having pressure due to high tone PFMs. Pt tolerated internal releases well with reduction in pressure down to 1-2/10 after releases and PF stretches.     Plan:     NV: int releases / BF  Urge suppression techniques response?   Assess pelvic alignment       Current Problem:   1. High-tone pelvic floor dysfunction in female        2. Coccyx pain        3. Hip fracture due to osteoporosis with routine healing  Follow Up In Physical Therapy            Subjective    General:    Reports she was doing a lot of lifting and stated she could feel her uterus with her pressure. Pushed it back it and called her OBGYN who can't get her in until Thursday. Feeling a lot more pressure in her rectum.     Took her BF device home and thinks her grandkid lost it.     Current pressure in rectum: 6/10. Denies changes with diarrhea/constipation.   Tailbone pain: not pain currently, just pressure.   Pain with penetration: hasn't had any.   Leakage with urge: has been good. Just went to the bathroom and when standing from chair had some leakage.     Played pickleball yesterday and didn't have leakage or pressure.     Precautions:   NONE   Pain:         Objective   L anterior pelvic rotation     Assessment of POP in supine and standing without visualization noted today.      Treatments:    TherAct: 10 min   Internal assessment due to increased POP symptoms- see obj section.     Manual: 20 min   TrP releases to L LA, coccygeus to reduce mm spasm contributing to tailbone pain. + response noted.     TherEx: 23 min   Piriformis stretch 2 ways x60sec B each   Supine butterfly stretch x60sec  Happy baby x60 sec   Modified pascual stretch x60sec   Prone mini cobra x60sec   Puppy pose x30sec   Cat cow x10  Wag the tail x10         HEP   Access Code: [email or web address]  URL: [email or web address]  Date: 09/03/2024  Prepared by: Jada Christianson    - Supine Piriformis Stretch with Foot on Ground  - 1 x daily - 7 x weekly - 2 reps - 30-60sec hold  - Supine Diaphragmatic Breathing  - 1 x daily - 7 x weekly - 3 sets - 10 reps  - Cat Cow  - 1 x daily - 7 x weekly - 3 sets - 10 reps

## 2024-09-23 ENCOUNTER — TELEPHONE (OUTPATIENT)
Dept: PHYSICAL THERAPY | Facility: HOSPITAL | Age: 69
End: 2024-09-23
Payer: MEDICARE

## 2024-09-24 ENCOUNTER — APPOINTMENT (OUTPATIENT)
Dept: PHYSICAL THERAPY | Facility: HOSPITAL | Age: 69
End: 2024-09-24
Payer: MEDICARE

## 2024-10-01 ENCOUNTER — DOCUMENTATION (OUTPATIENT)
Dept: PHYSICAL THERAPY | Facility: HOSPITAL | Age: 69
End: 2024-10-01
Payer: MEDICARE

## 2024-10-01 ENCOUNTER — APPOINTMENT (OUTPATIENT)
Dept: PHYSICAL THERAPY | Facility: HOSPITAL | Age: 69
End: 2024-10-01
Payer: MEDICARE

## 2024-10-01 NOTE — PROGRESS NOTES
Therapy Communication Note    Patient Name: Starla Carlos  MRN: [id number]  Department:   Room: Room/bed info not found  Today's Date: 10/1/2024     Discipline: Physical Therapy    Missed Visit Reason:  Wrote down wrong appt time     Missed Time: Cancel    Comment: Pt arrived 14 min late to PT session today and therapist decided to RS due to time constraints with treatment to be provided. Pt rescheduled to next Friday.

## 2024-10-11 ENCOUNTER — TREATMENT (OUTPATIENT)
Dept: PHYSICAL THERAPY | Facility: HOSPITAL | Age: 69
End: 2024-10-11
Payer: MEDICARE

## 2024-10-11 DIAGNOSIS — M80.059D HIP FRACTURE DUE TO OSTEOPOROSIS WITH ROUTINE HEALING: ICD-10-CM

## 2024-10-11 PROCEDURE — 97530 THERAPEUTIC ACTIVITIES: CPT | Mod: GP

## 2024-10-11 PROCEDURE — 97110 THERAPEUTIC EXERCISES: CPT | Mod: GP

## 2024-10-11 PROCEDURE — 97112 NEUROMUSCULAR REEDUCATION: CPT | Mod: GP

## 2024-10-11 NOTE — PROGRESS NOTES
Physical Therapy Evaluation and Treatment      Patient Name: Starla Carlos  MRN: [id number]  Today's Date: 10/11/2024    Time Entry:   Time Calculation  Start Time: 0900  Stop Time: 1000  Time Calculation (min): 60 min     PT Therapeutic Procedures Time Entry  Neuromuscular Re-Education Time Entry: 12  Therapeutic Exercise Time Entry: 15  Therapeutic Activity Time Entry: 27                 INS MEDICARE/ AARP 2230 ($0 USED ) COPAY 0 (MET) COVERAGE 80/100 OOP 0 AARP TO FOLLOW MEDICARE NO AUTH REQ [id number]/ALL   MCR CERT: 9/3/24-12/2/24  Visit #4    Assessment:    Initiated DN with estim today to promote L glute facilitation to reduce pelvic asymmetries. Added SIJ bias exercises to HEP today with + response noted with reduction in pain and feeling of limping. Pt reports sig pain reduction in hip with DN and estim. Followed up NMR with glute strengthening to promote glute and pelvic stab.     Pt denies pain and pressure at end of session.     Plan:     NV: int releases / BF  Assess pelvic alignment   Response to DN?   Glute strengthening       Current Problem:   1. Hip fracture due to osteoporosis with routine healing  Follow Up In Physical Therapy              Subjective    General:    Has a prolaspe of uterus, bladder and rectum. Feeling more pressure in the back than front.   Has an appt with urogyn on Nov. 11th. Will keep up with all exercises to minimize surgery.   Has over lifted and felt more pressure.     Current pressure: reports okay, 4/10 now more towards rectum.     Leakage: has been good and is observant on waiting longer. Cut back on coffee and is waiting longer to drink coffee. Reports no leakage- or not as much.     Pain with penetration: hasn't had any and  is really busy currently.     Tailbone pain is fine- just feeling some burning in rectum and pressure in rectum.     Precautions:   NONE   Pain:         Objective   L anterior pelvic rotation     Treatments:    TherEx: 15 min   L posterior  pelvic rotation bias x10 in standing   L posterior pelvic rotation bias x10 in sitting     3 way bridge x10 each       NMR: 12 min   DN to L superior and inferior glute max with 15Hz estim to promote glute facilitation x8 min   Patient was educated on risks and benefits associated with dry needling, what to expect, and post-procedure protocol (i.e. increased water intake, increased stretching, etc.).  Patient educated on signs and symptoms associated with pneumothorax and to go to ED if patient were to start experiencing these after being needled in thorax area.  Patient was also educated on modifications to HEP.  Verbal and written consent was received to proceed with treatment.    Tissue and pain science education.     TherAct: 27 min     Pt educated on POP support aids including OTC impressa bladder support inserts, pessary use and external compression garments (prolapse underwear, straps) to provide support for POP to reduce s/s. Pt educated on seeing urologist/urogyn for pessary fitting if desired.     Educated on low fodmap and anti-inflammatory diet.     Review of gyn visit notes to review POP staging.     Assessment of pelvic alignment.     HEP   Access Code: [email or web address]  URL: [email or web address]  Date: 09/03/2024  Prepared by: Jada Christianson    - Supine Piriformis Stretch with Foot on Ground  - 1 x daily - 7 x weekly - 2 reps - 30-60sec hold  - Supine Diaphragmatic Breathing  - 1 x daily - 7 x weekly - 3 sets - 10 reps  - Cat Cow  - 1 x daily - 7 x weekly - 3 sets - 10 reps

## 2024-10-17 ENCOUNTER — TREATMENT (OUTPATIENT)
Dept: PHYSICAL THERAPY | Facility: HOSPITAL | Age: 69
End: 2024-10-17
Payer: MEDICARE

## 2024-10-17 DIAGNOSIS — M53.3 COCCYX PAIN: ICD-10-CM

## 2024-10-17 DIAGNOSIS — M62.89 HIGH-TONE PELVIC FLOOR DYSFUNCTION IN FEMALE: Primary | ICD-10-CM

## 2024-10-17 DIAGNOSIS — M80.059D HIP FRACTURE DUE TO OSTEOPOROSIS WITH ROUTINE HEALING: ICD-10-CM

## 2024-10-17 PROCEDURE — 97112 NEUROMUSCULAR REEDUCATION: CPT | Mod: GP

## 2024-10-17 PROCEDURE — 97140 MANUAL THERAPY 1/> REGIONS: CPT | Mod: GP

## 2024-10-17 PROCEDURE — 97110 THERAPEUTIC EXERCISES: CPT | Mod: GP

## 2024-10-17 NOTE — PROGRESS NOTES
Physical Therapy Evaluation and Treatment      Patient Name: Starla Carlos  MRN: [id number]  Today's Date: 10/17/2024    Time Entry:   Time Calculation  Start Time: 0900  Stop Time: 0957  Time Calculation (min): 57 min     PT Therapeutic Procedures Time Entry  Manual Therapy Time Entry: 15  Neuromuscular Re-Education Time Entry: 15  Therapeutic Exercise Time Entry: 23                 [place] MEDICARE/ [employer] 2230 ($0 USED ) COPAY 0 (MET) COVERAGE 80/100 OOP 0 [employer] TO FOLLOW MEDICARE NO AUTH REQ [id number]   MCR CERT: 9/3/24-12/2/24  Visit #5    Assessment:    Tx focused on pelvic alignment and glute activation with + response noted. HEP adjusted accordingly to promote strengthening. Pt denies pain and reports feeling even at end of session.     Plan:     Reassess        Current Problem:   1. High-tone pelvic floor dysfunction in female        2. Coccyx pain        3. Hip fracture due to osteoporosis with routine healing  Follow Up In Physical Therapy              Subjective    General:    Pt reports her hips are off and self MET helped a lot. Going to chiro after this today.     Current pain: 4/10 across whole back and feels like she isn't walking right.     After needling last session felt great on her L side but R side not as great- reports it felt good for at least 1 week.     Reports pressure was great after DN last session. Current pressure has been pretty good ever since.     Getting into urogyn next Friday morning.     Leakage: reports slight leakage way in the front of her underwear- leakage has been worse since her hips have been off and a lot of stress from speeding ticket. Urge suppression techniques help. Reports it is very mental for her.     Precautions:   NONE   Pain:         Objective   L anterior pelvic rotation     Treatments:    TherEx: 23 min   Self MET to correct L anterior pelvic rotation   SL STS x10 B   SL clamshell x10 B   Mini squats x10     NMR: 15 min   DN to L superior glute max and  piriformis to pseudotetany with 15Hz estim to promote glute facilitation x14min   Patient was educated on risks and benefits associated with dry needling, what to expect, and post-procedure protocol (i.e. increased water intake, increased stretching, etc.).  Patient educated on signs and symptoms associated with pneumothorax and to go to ED if patient were to start experiencing these after being needled in thorax area.  Patient was also educated on modifications to HEP.  Verbal and written consent was received to proceed with treatment.    Tissue and pain science education.     Manual: 15 min     Assessment of pelvic alignment with MET to correct:   - L anterior pelvic rotation     Pt given self MET to promote optimal pelvic alignment at home on own as needed     HEP   Access Code: [email or web address]  URL: [email or web address]  Date: 09/03/2024  Prepared by: Jada Christianson    - Supine Piriformis Stretch with Foot on Ground  - 1 x daily - 7 x weekly - 2 reps - 30-60sec hold  - Supine Diaphragmatic Breathing  - 1 x daily - 7 x weekly - 3 sets - 10 reps  - Cat Cow  - 1 x daily - 7 x weekly - 3 sets - 10 reps

## 2024-10-21 ENCOUNTER — TREATMENT (OUTPATIENT)
Dept: PHYSICAL THERAPY | Facility: HOSPITAL | Age: 69
End: 2024-10-21
Payer: MEDICARE

## 2024-10-21 DIAGNOSIS — M62.89 HIGH-TONE PELVIC FLOOR DYSFUNCTION IN FEMALE: Primary | ICD-10-CM

## 2024-10-21 DIAGNOSIS — M53.3 COCCYX PAIN: ICD-10-CM

## 2024-10-21 DIAGNOSIS — M80.059D HIP FRACTURE DUE TO OSTEOPOROSIS WITH ROUTINE HEALING: ICD-10-CM

## 2024-10-21 PROCEDURE — 97530 THERAPEUTIC ACTIVITIES: CPT | Mod: GP

## 2024-10-21 PROCEDURE — 97140 MANUAL THERAPY 1/> REGIONS: CPT | Mod: GP

## 2024-10-21 PROCEDURE — 97110 THERAPEUTIC EXERCISES: CPT | Mod: GP

## 2024-10-21 NOTE — PROGRESS NOTES
Physical Therapy Evaluation and Treatment      Patient Name: Starla Carlos  MRN: [id number]  Today's Date: 10/21/2024    Time Entry:   Time Calculation  Start Time: 0145  Stop Time: 0232  Time Calculation (min): 47 min     PT Therapeutic Procedures Time Entry  Manual Therapy Time Entry: 15  Therapeutic Exercise Time Entry: 10  Therapeutic Activity Time Entry: 20                 [place] MEDICARE/ [employer] 2230 ($0 USED ) COPAY 0 (MET) COVERAGE 80/100 OOP 0 [employer] TO FOLLOW MEDICARE NO AUTH REQ [id number]   MCR CERT: 9/3/24-12/2/24  Visit #6    Assessment:    Pt continues to show asymmetric pelvis contributing to s/s with reduction in pressure symptoms after MET and pelvic stabilization. Pt will continue to benefit from strengthening to promote pelvic neutral. Discussed use of SI belt when performing aggravating activities to reduce flare up of pain.     Plan:   Reassess        Current Problem:   1. High-tone pelvic floor dysfunction in female        2. Coccyx pain        3. Hip fracture due to osteoporosis with routine healing  Follow Up In Physical Therapy              Subjective    General:    Made it through the whole weekend okay and is just a little tight on her R side.     Leakage is only when she stands up after going to the bathroom.     Had a really good bowel movement.    A lot of her pressure is in the front, right side of her pelvic floor now. Thinks she might have a UTI.       Precautions:   NONE   Pain:         Objective   L anterior pelvic rotation   R depressed pubis     Treatments:    TherEx: 10 min   Hip ADD isometric 10 sec hold 2x10   Hip ABD isometric 10 sec hold x10   Prone on elbows x2 min     Manual: 15 min     Assessment of pelvic alignment with MET to correct:   - L anterior pelvic rotation   - R elevated pubis     + reduction in symptoms after R elevated pubis     TherAct: 20 min   Pt educated on PVD management including:   - troy  - trunk leans on toilet   - double voids     Discussed use of  estradiol and vulvar creams/gels/balms to assist with tissue health and vulvar discomfort.     Pt educated on centralization of lumbar radiculopathy in extension based exercises.     Pt educated on importance of exhale on exertion and avoidance of valsalva maneuver (holding breath) to allow for maintenance of normal pressures within abdomen and thorax to avoid excess pressure on PFMs that may cause leakage.    Trial of SI belt with + response noted. Info given on belts to promote lumbopelvic stabilization as needed.       HEP   Access Code: [email or web address]  URL: [email or web address]  Date: 09/03/2024  Prepared by: Jada Christianson

## 2024-10-28 ENCOUNTER — TREATMENT (OUTPATIENT)
Dept: PHYSICAL THERAPY | Facility: HOSPITAL | Age: 69
End: 2024-10-28
Payer: MEDICARE

## 2024-10-28 DIAGNOSIS — M80.059D HIP FRACTURE DUE TO OSTEOPOROSIS WITH ROUTINE HEALING: ICD-10-CM

## 2024-10-28 DIAGNOSIS — M62.89 HIGH-TONE PELVIC FLOOR DYSFUNCTION IN FEMALE: Primary | ICD-10-CM

## 2024-10-28 DIAGNOSIS — M53.3 COCCYX PAIN: ICD-10-CM

## 2024-10-28 PROCEDURE — 97110 THERAPEUTIC EXERCISES: CPT | Mod: GP

## 2024-10-28 PROCEDURE — 97530 THERAPEUTIC ACTIVITIES: CPT | Mod: GP

## (undated) DEVICE — Device

## (undated) DEVICE — DRAPE, SHEET, U, STERI DRAPE, 47 X 51 IN, DISPOSABLE, STERILE

## (undated) DEVICE — GUIDEWIRE, 3.2 X 400

## (undated) DEVICE — GOWN, SURGICAL, ROYAL SILK, XL, STERILE

## (undated) DEVICE — PAD, POST, PERINEAL, ADULT

## (undated) DEVICE — GLOVE, SURGICAL, PROTEXIS PI BLUE W/NEUTHERA, 8.0, PF, LF

## (undated) DEVICE — GLOVE, SURGICAL, PROTEXIS PI , 9.0, PF, LF

## (undated) DEVICE — LINER, BOOT, TRACTION, UNIVERSAL, DISP

## (undated) DEVICE — SUTURE, VICRYL, 2-0, 18 IN CP-2, UNDYED

## (undated) DEVICE — STRAP, VELCRO, BODY, 4 X 60IN, NS

## (undated) DEVICE — DRAPE, ISOLATION, INCISE, W/POUCH, STERI DRAPE, 125 X 83 IN, DISPOSABLE, STERILE

## (undated) DEVICE — DRAPE SHEET, UTILITY, 26 X 15, W/ TAPE, STERILE

## (undated) DEVICE — STAPLER, SKIN, PLUS, WIDE, 35

## (undated) DEVICE — APPLICATOR, CHLORAPREP, W/ORANGE TINT, 26ML

## (undated) DEVICE — DRESSING, MEPILEX BORDER, POST-OP AG, 4 X 6 IN

## (undated) DEVICE — GOWN, SURGICAL, ROYAL SILK, LG, STERILE

## (undated) DEVICE — SUTURE, VICRYL 2-0, TAPER POINT, CT-1 UNDYED 27 INCH

## (undated) DEVICE — MAT, FLOOR, STANDARD FLUID BARRIER, 32X44, GREEN

## (undated) DEVICE — DRAPE, INCISE, ANTIMICROBIAL, IOBAN 2, LARGE, 17 X 23 IN, DISPOSABLE, STERILE

## (undated) DEVICE — SUTURE, VICRYL 0, TAPER POINT, CT-1 VIOLET 27 INCH

## (undated) DEVICE — GLOVE, SURGICAL, PROTEXIS PI , 7.5, PF, LF

## (undated) DEVICE — TOWEL PACK, STERILE, 4/PACK, BLUE

## (undated) DEVICE — BANDAGE, COFLEX, 6 X 5 YDS, FOAM TAN, STERILE, LF